# Patient Record
Sex: FEMALE | Race: WHITE | NOT HISPANIC OR LATINO | Employment: UNEMPLOYED | URBAN - METROPOLITAN AREA
[De-identification: names, ages, dates, MRNs, and addresses within clinical notes are randomized per-mention and may not be internally consistent; named-entity substitution may affect disease eponyms.]

---

## 2021-06-16 ENCOUNTER — TRANSCRIBE ORDERS (OUTPATIENT)
Dept: ADMINISTRATIVE | Facility: HOSPITAL | Age: 21
End: 2021-06-16

## 2021-06-16 DIAGNOSIS — N63.0 BREAST NODULE: Primary | ICD-10-CM

## 2023-01-10 ENCOUNTER — OFFICE VISIT (OUTPATIENT)
Dept: FAMILY MEDICINE CLINIC | Facility: CLINIC | Age: 23
End: 2023-01-10
Payer: COMMERCIAL

## 2023-01-10 ENCOUNTER — LAB (OUTPATIENT)
Dept: LAB | Facility: HOSPITAL | Age: 23
End: 2023-01-10
Payer: COMMERCIAL

## 2023-01-10 VITALS
TEMPERATURE: 97.8 F | OXYGEN SATURATION: 96 % | HEART RATE: 94 BPM | HEIGHT: 65 IN | BODY MASS INDEX: 46.15 KG/M2 | DIASTOLIC BLOOD PRESSURE: 64 MMHG | WEIGHT: 277 LBS | SYSTOLIC BLOOD PRESSURE: 110 MMHG

## 2023-01-10 DIAGNOSIS — R10.13 EPIGASTRIC PAIN: ICD-10-CM

## 2023-01-10 DIAGNOSIS — R10.30 LOWER ABDOMINAL PAIN: ICD-10-CM

## 2023-01-10 DIAGNOSIS — Z00.00 ANNUAL PHYSICAL EXAM: Primary | ICD-10-CM

## 2023-01-10 DIAGNOSIS — E28.2 PCOS (POLYCYSTIC OVARIAN SYNDROME): ICD-10-CM

## 2023-01-10 DIAGNOSIS — R13.19 OTHER DYSPHAGIA: ICD-10-CM

## 2023-01-10 DIAGNOSIS — Z00.00 ENCOUNTER FOR MEDICAL EXAMINATION TO ESTABLISH CARE: ICD-10-CM

## 2023-01-10 DIAGNOSIS — G47.33 OBSTRUCTIVE SLEEP APNEA SYNDROME: ICD-10-CM

## 2023-01-10 DIAGNOSIS — E03.9 ACQUIRED HYPOTHYROIDISM: ICD-10-CM

## 2023-01-10 DIAGNOSIS — K58.2 IRRITABLE BOWEL SYNDROME WITH BOTH CONSTIPATION AND DIARRHEA: Chronic | ICD-10-CM

## 2023-01-10 DIAGNOSIS — R19.7 DIARRHEA, UNSPECIFIED TYPE: ICD-10-CM

## 2023-01-10 PROBLEM — F41.9 ANXIETY: Status: ACTIVE | Noted: 2022-12-16

## 2023-01-10 PROCEDURE — 86037 ANCA TITER EACH ANTIBODY: CPT

## 2023-01-10 PROCEDURE — 99214 OFFICE O/P EST MOD 30 MIN: CPT | Performed by: NURSE PRACTITIONER

## 2023-01-10 PROCEDURE — 36415 COLL VENOUS BLD VENIPUNCTURE: CPT

## 2023-01-10 PROCEDURE — 83690 ASSAY OF LIPASE: CPT

## 2023-01-10 PROCEDURE — 84443 ASSAY THYROID STIM HORMONE: CPT

## 2023-01-10 PROCEDURE — 99385 PREV VISIT NEW AGE 18-39: CPT | Performed by: NURSE PRACTITIONER

## 2023-01-10 PROCEDURE — 80053 COMPREHEN METABOLIC PANEL: CPT

## 2023-01-10 PROCEDURE — 85027 COMPLETE CBC AUTOMATED: CPT

## 2023-01-10 PROCEDURE — 86671 FUNGUS NES ANTIBODY: CPT

## 2023-01-10 RX ORDER — OMEPRAZOLE 40 MG/1
40 CAPSULE, DELAYED RELEASE ORAL DAILY
Qty: 90 CAPSULE | Refills: 0 | Status: SHIPPED | OUTPATIENT
Start: 2023-01-10

## 2023-01-10 RX ORDER — LEVOTHYROXINE SODIUM 0.2 MG/1
200 TABLET ORAL DAILY
COMMUNITY
Start: 2012-01-09 | End: 2023-01-10 | Stop reason: SDUPTHER

## 2023-01-10 RX ORDER — LEVOTHYROXINE SODIUM 0.2 MG/1
200 TABLET ORAL
Qty: 90 TABLET | Refills: 3 | Status: SHIPPED | OUTPATIENT
Start: 2023-01-10

## 2023-01-10 RX ORDER — EPINEPHRINE 0.3 MG/.3ML
0.3 INJECTION SUBCUTANEOUS
COMMUNITY
Start: 2022-09-23

## 2023-01-10 RX ORDER — METFORMIN HYDROCHLORIDE 750 MG/1
750 TABLET, EXTENDED RELEASE ORAL
COMMUNITY
Start: 2022-12-16
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
## 2023-01-11 LAB
ALBUMIN SERPL-MCNC: 4.3 G/DL (ref 3.5–5.2)
ALBUMIN/GLOB SERPL: 1.7 G/DL
ALP SERPL-CCNC: 92 U/L (ref 39–117)
ALT SERPL W P-5'-P-CCNC: 38 U/L (ref 1–33)
ANION GAP SERPL CALCULATED.3IONS-SCNC: 9 MMOL/L (ref 5–15)
AST SERPL-CCNC: 38 U/L (ref 1–32)
BILIRUB SERPL-MCNC: 0.2 MG/DL (ref 0–1.2)
BUN SERPL-MCNC: 9 MG/DL (ref 6–20)
BUN/CREAT SERPL: 10.8 (ref 7–25)
CALCIUM SPEC-SCNC: 9.4 MG/DL (ref 8.6–10.5)
CHLORIDE SERPL-SCNC: 107 MMOL/L (ref 98–107)
CO2 SERPL-SCNC: 26 MMOL/L (ref 22–29)
CREAT SERPL-MCNC: 0.83 MG/DL (ref 0.57–1)
DEPRECATED RDW RBC AUTO: 41.4 FL (ref 37–54)
EGFRCR SERPLBLD CKD-EPI 2021: 101.7 ML/MIN/1.73
ERYTHROCYTE [DISTWIDTH] IN BLOOD BY AUTOMATED COUNT: 12.4 % (ref 12.3–15.4)
GLOBULIN UR ELPH-MCNC: 2.5 GM/DL
GLUCOSE SERPL-MCNC: 86 MG/DL (ref 65–99)
HCT VFR BLD AUTO: 38 % (ref 34–46.6)
HGB BLD-MCNC: 12.6 G/DL (ref 12–15.9)
LIPASE SERPL-CCNC: 27 U/L (ref 13–60)
MCH RBC QN AUTO: 30.4 PG (ref 26.6–33)
MCHC RBC AUTO-ENTMCNC: 33.2 G/DL (ref 31.5–35.7)
MCV RBC AUTO: 91.6 FL (ref 79–97)
PLATELET # BLD AUTO: 263 10*3/MM3 (ref 140–450)
PMV BLD AUTO: 11.1 FL (ref 6–12)
POTASSIUM SERPL-SCNC: 3.9 MMOL/L (ref 3.5–5.2)
PROT SERPL-MCNC: 6.8 G/DL (ref 6–8.5)
RBC # BLD AUTO: 4.15 10*6/MM3 (ref 3.77–5.28)
SODIUM SERPL-SCNC: 142 MMOL/L (ref 136–145)
TSH SERPL DL<=0.05 MIU/L-ACNC: 43.2 UIU/ML (ref 0.27–4.2)
WBC NRBC COR # BLD: 11.5 10*3/MM3 (ref 3.4–10.8)

## 2023-01-12 ENCOUNTER — TELEPHONE (OUTPATIENT)
Dept: FAMILY MEDICINE CLINIC | Facility: CLINIC | Age: 23
End: 2023-01-12
Payer: COMMERCIAL

## 2023-01-12 ENCOUNTER — TELEPHONE (OUTPATIENT)
Dept: FAMILY MEDICINE CLINIC | Facility: CLINIC | Age: 23
End: 2023-01-12

## 2023-01-12 DIAGNOSIS — E03.9 ACQUIRED HYPOTHYROIDISM: Primary | ICD-10-CM

## 2023-01-12 DIAGNOSIS — R11.2 NAUSEA AND VOMITING, UNSPECIFIED VOMITING TYPE: Primary | ICD-10-CM

## 2023-01-12 RX ORDER — LEVOTHYROXINE SODIUM 0.03 MG/1
25 TABLET ORAL
Qty: 30 TABLET | Refills: 2 | Status: SHIPPED | OUTPATIENT
Start: 2023-01-12

## 2023-01-12 RX ORDER — ONDANSETRON 4 MG/1
4 TABLET, ORALLY DISINTEGRATING ORAL EVERY 8 HOURS PRN
Qty: 30 TABLET | Refills: 0 | Status: SHIPPED | OUTPATIENT
Start: 2023-01-12 | End: 2023-02-14

## 2023-01-12 NOTE — TELEPHONE ENCOUNTER
Patient called back to office approximately 5 minutes after I called her. I discussed her elevated TSH with her and encouraged her to take the medication first thing in the morning on an empty stomach 30 minutes before eating. I advised her that I plan to increase the dosage of her levothyroxine from 200 mcg/day to 225 mcg/day and have her return for TSH in 8 weeks. If TSH remains elevated at this time I recommend endocrinology referral.

## 2023-01-12 NOTE — TELEPHONE ENCOUNTER
Caller: Mayi Poon    Relationship to patient: Self    Best call back number: [phone]    Patient is needing: PATIENT STATES SHE HAS BEEN VOMITING FOR THE LAST 3-4 HOURS FOLLOWING THE RESULT THAT WHITE BLOOD CELLS WERE ELEVATED.

## 2023-01-13 LAB
BAKER'S YEAST IGA QN: <20 UNITS (ref 0–24.9)
BAKER'S YEAST IGG QN: <20 UNITS (ref 0–24.9)
P-ANCA ATYPICAL TITR SER IF: NORMAL TITER

## 2023-01-14 PROBLEM — G47.33 OBSTRUCTIVE SLEEP APNEA SYNDROME: Chronic | Status: ACTIVE | Noted: 2023-01-10

## 2023-01-14 PROBLEM — E03.9 ACQUIRED HYPOTHYROIDISM: Chronic | Status: ACTIVE | Noted: 2022-12-16

## 2023-01-14 PROBLEM — E28.2 PCOS (POLYCYSTIC OVARIAN SYNDROME): Chronic | Status: ACTIVE | Noted: 2022-12-16

## 2023-01-14 PROBLEM — F41.9 ANXIETY: Chronic | Status: ACTIVE | Noted: 2022-12-16

## 2023-02-14 ENCOUNTER — OFFICE VISIT (OUTPATIENT)
Dept: SLEEP MEDICINE | Facility: HOSPITAL | Age: 23
End: 2023-02-14
Payer: COMMERCIAL

## 2023-02-14 VITALS
OXYGEN SATURATION: 98 % | DIASTOLIC BLOOD PRESSURE: 60 MMHG | SYSTOLIC BLOOD PRESSURE: 111 MMHG | BODY MASS INDEX: 46.65 KG/M2 | HEIGHT: 65 IN | WEIGHT: 280 LBS | HEART RATE: 79 BPM

## 2023-02-14 DIAGNOSIS — G47.33 OSA (OBSTRUCTIVE SLEEP APNEA): Primary | ICD-10-CM

## 2023-02-14 PROCEDURE — 99213 OFFICE O/P EST LOW 20 MIN: CPT | Performed by: NURSE PRACTITIONER

## 2023-02-14 NOTE — PROGRESS NOTES
Chief Complaint:   Chief Complaint   Patient presents with   • Sleeping Problem       HPI:    Mayi Poon is a 22 y.o. female here to establish care.  She does see Dr. Marian Malcolm as PCP.  Patient currently has a history of PCOS, hypothyroidism, IBS, ADHD, anxiety and MOLLY.  Patient does have a machine that is 5 years old or greater and does need an order today for a new machine.    Before using CPAP she did have snoring, increased daytime sleepiness, heartburn, dry mouth, difficulty staying asleep and nighttime sweating.  Patient states the symptoms are controlled with CPAP therapy.  Her machine is greater than 5 years old and is requesting an order for a new one today.    During the week will go to bed 8-10 get up at 6 AM weekend going to bed at 10-12 getting up anywhere from 9-11.  She does estimate getting 8 to 9 hours of sleep nightly.  She will goes to sleep within 20 minutes and states she is aware during the night that she is awake but does not need to get up and down.  Patient states when she does nap she will take a 5-hour nap.  Patient does put her [place] score of 10/24.    Patient does understand the consequences of untreated sleep apnea and wishes to continue therapy.    Social history  This is a 22-year-old pleasant female.  She does smoke 1/4 pack of cigarettes daily.  And will drink 2-3 times a week.  Patient will drink 3 cups of regular coffee a week, 2 cups of regular tea a day and 1 cola throughout the week.  She does denies illicit substance use.    Past Medical History:   Diagnosis Date   • ADHD (attention deficit hyperactivity disorder) 13 years ago   • Hyperthyroidism 12 years ago   • Obesity        Family History   Problem Relation Age of Onset   • Stroke Mother    • Thyroid disease Mother    • Thyroid disease Maternal Aunt    • Arthritis Maternal Grandmother    • Thyroid disease Maternal Grandmother    • Sleep apnea Maternal Grandfather    • Thyroid disease Paternal Grandmother    •  Cancer Paternal Grandfather        History reviewed. No pertinent surgical history.      Current medications are:   Current Outpatient Medications:   •  EPINEPHrine (EPIPEN) 0.3 MG/0.3ML solution auto-injector injection, Inject 0.3 mg into the appropriate muscle as directed by prescriber., Disp: , Rfl:   •  levothyroxine (SYNTHROID, LEVOTHROID) 200 MCG tablet, Take 1 tablet by mouth Every Morning., Disp: 90 tablet, Rfl: 3  •  levothyroxine (SYNTHROID, LEVOTHROID) 25 MCG tablet, Take 1 tablet by mouth Every Morning. Take in addition to 200 mcg dose of levothyroxine for total 225 mcg/day., Disp: 30 tablet, Rfl: 2  •  metFORMIN ER (GLUCOPHAGE-XR) 750 MG 24 hr tablet, Take 750 mg by mouth., Disp: , Rfl:   •  omeprazole (priLOSEC) 40 MG capsule, Take 1 capsule by mouth Daily. Take 30 minutes 1st meal of the day, Disp: 90 capsule, Rfl: 0  •  sertraline (ZOLOFT) 50 MG tablet, Take  by mouth., Disp: , Rfl: .      The patient's relevant past medical, surgical, family and social history were reviewed and updated in Epic as appropriate.       Review of Systems   Constitutional: Positive for fatigue.   Gastrointestinal: Positive for constipation, diarrhea and vomiting.   Musculoskeletal: Positive for myalgias.   Psychiatric/Behavioral: Positive for sleep disturbance. The patient is nervous/anxious.    All other systems reviewed and are negative.        Objective:    Physical Exam  Constitutional:       Appearance: Normal appearance.   HENT:      Head: Normocephalic and atraumatic.      Mouth/Throat:      Mouth: Mucous membranes are moist.      Pharynx: Oropharynx is clear.      Comments: Mallampati 4 anatomy  Cardiovascular:      Rate and Rhythm: Normal rate and regular rhythm.   Pulmonary:      Effort: Pulmonary effort is normal.      Breath sounds: Normal breath sounds.   Skin:     General: Skin is warm and dry.   Neurological:      Mental Status: She is alert and oriented to person, place, and time.   Psychiatric:          Mood and Affect: Mood normal.         Behavior: Behavior normal.         Thought Content: Thought content normal.         Judgment: Judgment normal.       30 over 30 days of use  Greater than 4-hour use 100%  Setting 9-15  95th percentile pressure 13.8  AHI 0.4  CPAP Report      The patient continues to use and benefit from CPAP therapy.    ASSESSMENT/PLAN    Diagnoses and all orders for this visit:    1. MOLLY (obstructive sleep apnea) (Primary)  -     PAP Therapy        1. Counseled patient regarding multimodal approach with healthy nutrition, healthy sleep, regular physical activity, social activities, counseling, and medications. Encouraged to practice lateral sleep position. Avoid alcohol and sedatives close to bedtime.  2.   For supplies x1 year.  We will do an order for new machine and see her back in 31 to 90 days.    I have reviewed the results of my evaluation and impression and discussed my recommendations in detail with the patient.      Signed by  DAGMAR Kelly    February 14, 2023      CC: Marian Malcolm APRN Johnstone, Julia, APRN

## 2023-02-17 ENCOUNTER — PATIENT ROUNDING (BHMG ONLY) (OUTPATIENT)
Dept: SLEEP MEDICINE | Facility: HOSPITAL | Age: 23
End: 2023-02-17
Payer: COMMERCIAL

## 2023-02-17 NOTE — PROGRESS NOTES
February 17, 2023    Hello, may I speak with Mayi Black?    My name is DONITA    I am  THE PRACTICE LEAD HERE with [facility]  [address]  [facility] [address] [phone].    Before we get started may I verify your date of birth? 2000    I am calling to officially welcome you to our practice and ask about your recent visit. Is this a good time to talk? YES    Tell me about your visit with us. What things went well?  IT WAS GOOD       We're always looking for ways to make our patients' experiences even better. Do you have recommendations on ways we may improve?  NO IT WENT FINE    Overall were you satisfied with your first visit to our practice? YES     I appreciate you taking the time to speak with me today. Is there anything else I can do for you? NO      Thank you, and have a great day.

## 2023-04-10 DIAGNOSIS — R10.13 EPIGASTRIC PAIN: ICD-10-CM

## 2023-04-10 RX ORDER — OMEPRAZOLE 40 MG/1
CAPSULE, DELAYED RELEASE ORAL
Qty: 90 CAPSULE | Refills: 0 | Status: SHIPPED | OUTPATIENT
Start: 2023-04-10

## 2023-05-24 ENCOUNTER — TELEPHONE (OUTPATIENT)
Dept: FAMILY MEDICINE CLINIC | Facility: CLINIC | Age: 23
End: 2023-05-24
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
## 2023-05-25 DIAGNOSIS — Z87.898 HX OF MOTION SICKNESS: Primary | ICD-10-CM

## 2023-05-25 RX ORDER — MECLIZINE HYDROCHLORIDE 25 MG/1
25 TABLET ORAL 3 TIMES DAILY PRN
Qty: 30 TABLET | Refills: 0 | Status: SHIPPED | OUTPATIENT
Start: 2023-05-25

## 2023-07-16 PROBLEM — Z91.018 MULTIPLE FOOD ALLERGIES: Status: ACTIVE | Noted: 2023-07-16

## 2023-07-16 PROBLEM — R10.31 RIGHT LOWER QUADRANT PAIN: Status: ACTIVE | Noted: 2023-07-16

## 2023-07-16 PROBLEM — R10.11 RIGHT UPPER QUADRANT PAIN: Status: ACTIVE | Noted: 2023-07-16

## 2023-07-20 ENCOUNTER — TELEPHONE (OUTPATIENT)
Dept: FAMILY MEDICINE CLINIC | Facility: CLINIC | Age: 23
End: 2023-07-20

## 2023-07-20 NOTE — TELEPHONE ENCOUNTER
Caller: Mayi Poon    Relationship: Self    Best call back number: [phone]    What orders are you requesting (i.e. lab or imaging): , CEA 19    In what timeframe would the patient need to come in: ASAP    Where will you receive your lab/imaging services: IN OFFICE    Additional notes: PATIENT WOULD LIKE TO KNOW IF SHE CAN HAVE ORDERS TO GET BLOOD WORK TO CHECK THESE ITEMS. COLON CANCER RUNS IN PATIENT'S FAMILY AND HAS BEEN HAVING STOMACH ISSUES AND WANTED TO GET THIS CHECKED. PLEASE ADVISE

## 2023-08-13 DIAGNOSIS — E28.2 PCOS (POLYCYSTIC OVARIAN SYNDROME): ICD-10-CM

## 2023-08-13 DIAGNOSIS — Z76.0 MEDICATION REFILL: ICD-10-CM

## 2023-08-14 RX ORDER — METFORMIN HYDROCHLORIDE 750 MG/1
750 TABLET, EXTENDED RELEASE ORAL
Qty: 30 TABLET | Refills: 0 | Status: SHIPPED | OUTPATIENT
Start: 2023-08-14

## 2023-08-14 NOTE — TELEPHONE ENCOUNTER
Rx Refill Note  Requested Prescriptions     Pending Prescriptions Disp Refills    metFORMIN ER (GLUCOPHAGE-XR) 750 MG 24 hr tablet [Pharmacy Med Name: metFORMIN HCL  MG TABLET] 30 tablet 0     Sig: TAKE 1 TABLET BY MOUTH DAILY WITH BREAKFAST      Last office visit with prescribing clinician: 7/14/2023   Last telemedicine visit with prescribing clinician: Visit date not found   Next office visit with prescribing clinician: 8/16/2023                         Would you like a call back once the refill request has been completed: [] Yes [] No    If the office needs to give you a call back, can they leave a voicemail: [] Yes [] No    Moris Avalos MA  08/14/23, 08:26 EDT

## 2023-08-15 NOTE — TELEPHONE ENCOUNTER
PHARMACY CALLED AND WANT TO CLARIFY IF THIS IS CORRECT INSTEAD OF THE LAST PRESCRIPTION WHERE IT WAS THE SAME DOSAGE BUT TWICE A DAY.

## 2023-08-16 ENCOUNTER — OFFICE VISIT (OUTPATIENT)
Dept: FAMILY MEDICINE CLINIC | Facility: CLINIC | Age: 23
End: 2023-08-16
Payer: COMMERCIAL

## 2023-08-16 ENCOUNTER — LAB (OUTPATIENT)
Dept: LAB | Facility: HOSPITAL | Age: 23
End: 2023-08-16
Payer: COMMERCIAL

## 2023-08-16 VITALS
SYSTOLIC BLOOD PRESSURE: 114 MMHG | BODY MASS INDEX: 49.75 KG/M2 | WEIGHT: 291.4 LBS | HEIGHT: 64 IN | TEMPERATURE: 98 F | OXYGEN SATURATION: 98 % | HEART RATE: 76 BPM | DIASTOLIC BLOOD PRESSURE: 68 MMHG

## 2023-08-16 DIAGNOSIS — R55 SYNCOPE, UNSPECIFIED SYNCOPE TYPE: ICD-10-CM

## 2023-08-16 DIAGNOSIS — G47.00 INSOMNIA, UNSPECIFIED TYPE: ICD-10-CM

## 2023-08-16 DIAGNOSIS — R55 POSTURAL DIZZINESS WITH NEAR SYNCOPE: Primary | ICD-10-CM

## 2023-08-16 DIAGNOSIS — R42 POSTURAL DIZZINESS WITH NEAR SYNCOPE: Primary | ICD-10-CM

## 2023-08-16 LAB
ALBUMIN SERPL-MCNC: 4.2 G/DL (ref 3.5–5.2)
ALBUMIN/GLOB SERPL: 1.8 G/DL
ALP SERPL-CCNC: 78 U/L (ref 39–117)
ALT SERPL W P-5'-P-CCNC: 36 U/L (ref 1–33)
ANION GAP SERPL CALCULATED.3IONS-SCNC: 9.5 MMOL/L (ref 5–15)
AST SERPL-CCNC: 26 U/L (ref 1–32)
BILIRUB SERPL-MCNC: 0.3 MG/DL (ref 0–1.2)
BUN SERPL-MCNC: 9 MG/DL (ref 6–20)
BUN/CREAT SERPL: 10.5 (ref 7–25)
CALCIUM SPEC-SCNC: 8.7 MG/DL (ref 8.6–10.5)
CHLORIDE SERPL-SCNC: 107 MMOL/L (ref 98–107)
CO2 SERPL-SCNC: 24.5 MMOL/L (ref 22–29)
CREAT SERPL-MCNC: 0.86 MG/DL (ref 0.57–1)
EGFRCR SERPLBLD CKD-EPI 2021: 97.5 ML/MIN/1.73
GLOBULIN UR ELPH-MCNC: 2.4 GM/DL
GLUCOSE SERPL-MCNC: 96 MG/DL (ref 65–99)
HBA1C MFR BLD: 5.5 % (ref 4.8–5.6)
POTASSIUM SERPL-SCNC: 4.5 MMOL/L (ref 3.5–5.2)
PROT SERPL-MCNC: 6.6 G/DL (ref 6–8.5)
SODIUM SERPL-SCNC: 141 MMOL/L (ref 136–145)
TSH SERPL DL<=0.05 MIU/L-ACNC: 30.6 UIU/ML (ref 0.27–4.2)

## 2023-08-16 PROCEDURE — 93000 ELECTROCARDIOGRAM COMPLETE: CPT | Performed by: NURSE PRACTITIONER

## 2023-08-16 PROCEDURE — 83036 HEMOGLOBIN GLYCOSYLATED A1C: CPT

## 2023-08-16 PROCEDURE — 99214 OFFICE O/P EST MOD 30 MIN: CPT | Performed by: NURSE PRACTITIONER

## 2023-08-16 PROCEDURE — 84443 ASSAY THYROID STIM HORMONE: CPT

## 2023-08-16 PROCEDURE — 80053 COMPREHEN METABOLIC PANEL: CPT

## 2023-08-16 RX ORDER — HYDROXYZINE HYDROCHLORIDE 25 MG/1
25 TABLET, FILM COATED ORAL NIGHTLY PRN
Qty: 30 TABLET | Refills: 1 | Status: SHIPPED | OUTPATIENT
Start: 2023-08-16

## 2023-08-16 NOTE — PROGRESS NOTES
Follow Up Office Note     Patient Name: Mayi Poon  : 2000   MRN: [id number]     Chief Complaint:    Chief Complaint   Patient presents with    Dizziness     Per patient she has been having dizzy spells since last Monday- comes and goes. Patient states she is also tired.     Insomnia     Per patient she is able to go to sleep but can't stay asleep; only gets about 4 hrs a night.        History of Present Illness: Mayi Poon is a 22 y.o. female who presents today with c/o intermittent dizziness and several episodes of near syncope for the past week.     Patient also c/o difficulty sleeping for the past several weeks.       Subjective      I have reviewed and the following portions of the patient's history were updated as appropriate: past family history, past medical history, past social history, past surgical history and problem list.    Review of Systems:   Review of Systems   Constitutional:  Negative for appetite change, chills, diaphoresis and fever.   HENT: Negative.     Respiratory: Negative.     Cardiovascular:  Positive for palpitations (intermittent). Negative for chest pain and leg swelling.   Gastrointestinal: Negative.    Neurological:  Positive for dizziness, weakness (episodic) and light-headedness. Negative for tremors, seizures, facial asymmetry, speech difficulty, numbness and headaches.   Psychiatric/Behavioral:  Positive for sleep disturbance.       Past Medical History:   Past Medical History:   Diagnosis Date    ADHD (attention deficit hyperactivity disorder) 13 years ago    Hyperthyroidism 12 years ago    Obesity          Medications:     Current Outpatient Medications:     dicyclomine (BENTYL) 10 MG capsule, Take 1 capsule by mouth 4 (Four) Times a Day Before Meals & at Bedtime., Disp: 120 capsule, Rfl: 1    EPINEPHrine (EPIPEN) 0.3 MG/0.3ML solution auto-injector injection, Inject 0.3 mL into the appropriate muscle as directed by prescriber., Disp: , Rfl:     famotidine  "(Pepcid) 20 MG tablet, Take 1 tablet by mouth 2 (Two) Times a Day., Disp: 60 tablet, Rfl: 2    levothyroxine (SYNTHROID, LEVOTHROID) 200 MCG tablet, Take 1 tablet by mouth Every Morning., Disp: 90 tablet, Rfl: 3    meclizine (ANTIVERT) 25 MG tablet, Take 1 tablet by mouth 3 (Three) Times a Day As Needed for Nausea (motion sickness)., Disp: 30 tablet, Rfl: 0    metFORMIN ER (GLUCOPHAGE-XR) 750 MG 24 hr tablet, TAKE 1 TABLET BY MOUTH DAILY WITH BREAKFAST, Disp: 30 tablet, Rfl: 0    omeprazole (priLOSEC) 40 MG capsule, TAKE ONE CAPSULE BY MOUTH DAILY 30 MINUTES BEFORE THE 1ST MEAL OF THE DAY, Disp: 90 capsule, Rfl: 0    sertraline (ZOLOFT) 50 MG tablet, Take  by mouth., Disp: , Rfl:     hydrOXYzine (ATARAX) 25 MG tablet, Take 1 tablet by mouth At Night As Needed (insomnia)., Disp: 30 tablet, Rfl: 1    levothyroxine (SYNTHROID, LEVOTHROID) 25 MCG tablet, Take 1 tablet by mouth Every Morning. Take 1 tablet in addition to 200 mcg tablet of levothyroxine for total of 225 mcg/day., Disp: 30 tablet, Rfl: 2    Allergies:   Allergies   Allergen Reactions    Lactose Intolerance (Gi) GI Intolerance         Objective     Physical Exam:  Vital Signs:   Vitals:    08/16/23 0948   BP: 114/68   Pulse: 76   Temp: 98 øF (36.7 øC)   TempSrc: Infrared   SpO2: 98%   Weight: 132 kg (291 lb 6.4 oz)   Height: 162.6 cm (64.02\")   PainSc: 0-No pain     Body mass index is 49.99 kg/mý.     Physical Exam  Vitals and nursing note reviewed.   Constitutional:       General: She is not in acute distress.     Appearance: Normal appearance. She is well-developed. She is not ill-appearing, toxic-appearing or diaphoretic.   HENT:      Head: Normocephalic and atraumatic.   Cardiovascular:      Rate and Rhythm: Normal rate and regular rhythm.      Heart sounds: Normal heart sounds, S1 normal and S2 normal. No murmur heard.  Pulmonary:      Effort: Pulmonary effort is normal. No respiratory distress.      Breath sounds: Normal breath sounds. No stridor. No " wheezing.   Musculoskeletal:      Right lower leg: No edema.      Left lower leg: No edema.   Skin:     General: Skin is warm and dry.   Neurological:      General: No focal deficit present.      Mental Status: She is alert and oriented to person, place, and time.   Psychiatric:         Mood and Affect: Mood normal.         Behavior: Behavior normal. Behavior is cooperative.         Thought Content: Thought content normal.         Judgment: Judgment normal.       Assessment / Plan      Assessment/Plan:   Diagnoses and all orders for this visit:    1. Postural dizziness with near syncope (Primary)  Assessment & Plan:  New problem which requires further workup. Laboratory studies per orders, further recommendation after results evaluation.   Plan to refer to heart and valve center and neurology for further evaluation.    Orders:  -     TSH; Future  -     Comprehensive Metabolic Panel; Future  -     ECG 12 Lead  -     Ambulatory Referral to [facility] [place] - [facility]  -     Ambulatory Referral to Neurology  -     Hemoglobin A1c; Future    2. Insomnia, unspecified type  Assessment & Plan:  Newly identified problem. Plan to begin trial of hydroxyzine at bedtime. Patient may need to f/u with sleep medicine should sx fail to respond to medication therapy.    Orders:  -     hydrOXYzine (ATARAX) 25 MG tablet; Take 1 tablet by mouth At Night As Needed (insomnia).  Dispense: 30 tablet; Refill: 1       ECG 12 Lead    Date/Time: 8/16/2023 10:12 AM  Performed by: Marian Malcolm APRN  Authorized by: Marian Malcolm APRN   Comparison: not compared with previous ECG   Previous ECG: no previous ECG available  Rhythm: sinus rhythm  Rate: normal  BPM: 65  QRS axis: normal  Other: no other findings    Clinical impression: normal ECG         Follow Up:   PRN and at next scheduled appointment(s) with PCP.    Discussed the nature of the medical condition(s) risks, complications, implications, management, safe and proper  use of medications. Encouraged medication compliance, and keeping scheduled follow up appointments with me and any other providers.      RTC if symptoms fail to improve, to ER if symptoms worsen.        *Dictated Utilizing Dragon Dictation   Please note that portions of this note were completed with a voice recognition program.   Part of this note may be an electronic transcription/translation of spoken language to printed text using the Dragon Dictation System. Spelling and/or grammatical errors may exist despite efforts at proofreading.      NOTE TO PATIENT: The 21st Century Cures Act makes medical notes like these available to patients in the interest of transparency. However, be advised this is a medical document. It is intended as peer to peer communication. It is written in medical language and may contain abbreviations or verbiage that are unfamiliar. It may appear blunt or direct. Medical documents are intended to carry relevant information, facts as evident, and the clinical opinion of the practitioner.      DAGMAR Casillas  [facility] [place]

## 2023-08-18 DIAGNOSIS — E03.9 ACQUIRED HYPOTHYROIDISM: Primary | ICD-10-CM

## 2023-08-18 RX ORDER — LEVOTHYROXINE SODIUM 0.03 MG/1
25 TABLET ORAL
Qty: 30 TABLET | Refills: 2 | Status: SHIPPED | OUTPATIENT
Start: 2023-08-18

## 2023-08-20 PROBLEM — R55 POSTURAL DIZZINESS WITH NEAR SYNCOPE: Status: ACTIVE | Noted: 2023-08-20

## 2023-08-20 PROBLEM — G47.00 INSOMNIA: Status: ACTIVE | Noted: 2023-08-20

## 2023-08-20 PROBLEM — R42 POSTURAL DIZZINESS WITH NEAR SYNCOPE: Status: ACTIVE | Noted: 2023-08-20

## 2023-08-20 NOTE — ASSESSMENT & PLAN NOTE
Newly identified problem. Plan to begin trial of hydroxyzine at bedtime. Patient may need to f/u with sleep medicine should sx fail to respond to medication therapy.

## 2023-08-20 NOTE — ASSESSMENT & PLAN NOTE
New problem which requires further workup. Laboratory studies per orders, further recommendation after results evaluation.   Plan to refer to heart and valve center and neurology for further evaluation.

## 2023-08-25 ENCOUNTER — PATIENT ROUNDING (BHMG ONLY) (OUTPATIENT)
Dept: CARDIOLOGY | Facility: HOSPITAL | Age: 23
End: 2023-08-25
Payer: COMMERCIAL

## 2023-08-25 ENCOUNTER — HOSPITAL ENCOUNTER (OUTPATIENT)
Dept: CARDIOLOGY | Facility: HOSPITAL | Age: 23
Discharge: HOME | End: 2023-08-25
Payer: COMMERCIAL

## 2023-08-25 ENCOUNTER — OFFICE VISIT (OUTPATIENT)
Dept: CARDIOLOGY | Facility: HOSPITAL | Age: 23
End: 2023-08-25
Payer: COMMERCIAL

## 2023-08-25 VITALS
TEMPERATURE: 97 F | HEIGHT: 64 IN | RESPIRATION RATE: 18 BRPM | BODY MASS INDEX: 40.87 KG/M2 | DIASTOLIC BLOOD PRESSURE: 63 MMHG | SYSTOLIC BLOOD PRESSURE: 106 MMHG | OXYGEN SATURATION: 96 % | WEIGHT: 239.4 LBS | HEART RATE: 79 BPM

## 2023-08-25 DIAGNOSIS — R55 NEAR SYNCOPE: ICD-10-CM

## 2023-08-25 DIAGNOSIS — G47.33 OSA (OBSTRUCTIVE SLEEP APNEA): ICD-10-CM

## 2023-08-25 DIAGNOSIS — E03.9 HYPOTHYROIDISM, UNSPECIFIED TYPE: ICD-10-CM

## 2023-08-25 DIAGNOSIS — R00.2 PALPITATIONS: ICD-10-CM

## 2023-08-25 DIAGNOSIS — R42 DIZZINESS: ICD-10-CM

## 2023-08-25 DIAGNOSIS — R42 DIZZINESS: Primary | ICD-10-CM

## 2023-08-25 DIAGNOSIS — E66.01 MORBID OBESITY WITH BMI OF 50.0-59.9, ADULT: ICD-10-CM

## 2023-08-25 DIAGNOSIS — E28.2 PCOS (POLYCYSTIC OVARIAN SYNDROME): ICD-10-CM

## 2023-08-25 NOTE — PROGRESS NOTES
[facility] Heart Monitor Documentation    Mayi Poon  2000  [id number]  08/25/23      [] ZIO XT Patch  Model [id number] Prescribed for  Days    Serial Number: (N + 9 Digits) N   Apply-By Date on Box:   USPS Tracking Number:   USPS Tracking        [] [employer] MINI PLUS Mobile Cardiac Telemetry  Model BGMINIPLUS Prescribed for  Days    Serial Number: (BGM + 7 Digits) BGM  Shipped-By Date on Box:   UPS Tracking Number: [id number]  UPS Tracking      [x] [employer] MINI Holter Monitor  Model BGMINIEL Prescribed for 14 Days    Serial Number: (7 Digits) [id number]  Shipped-By Date on Box: 08/17/2023  UPS Tracking Number: [id number]  UPS Tracking        This monitor was applied to the patient's chest and checked for proper functioning.  Ms. Mayi Poon was instructed in the proper use of this monitor.  She was given the opportunity to ask questions and left the office with the device 's instruction manual.    Connie Kong, SANDEEP, 10:12 EDT, 08/25/23                  [facility] 8[facility]

## 2023-08-25 NOTE — PROGRESS NOTES
August 25, 2023    Hello, may I speak with Mayi Poon?    My name is Ange      I am  with [facility]  [address]  [facility] [address] [phone].    Before we get started may I verify your date of birth? 2000    I am calling to officially welcome you to our practice and ask about your recent visit. Is this a good time to talk? yes    Tell me about your visit with us. What things went well?  Everything. I liked talking to Lei.       We're always looking for ways to make our patients' experiences even better. Do you have recommendations on ways we may improve?  no    Overall were you satisfied with your first visit to our practice? yes       I appreciate you taking the time to speak with me today. Is there anything else I can do for you? no      Thank you, and have a great day.

## 2023-08-25 NOTE — PROGRESS NOTES
"[facility]    Chief Complaint  Syncope    Subjective    History of Present Illness {CC  Problem List  Visit  Diagnosis   Encounters  Notes  Medications  Labs  Result Review Imaging  Media :23}     Mayi Poon presents to [facility] CARDIOLOG[facility] for   History of Present Illness     22-year-old female with history of hypothyroidism, PCOS, IBS, morbid obesity, anxiety, ADD, obstructive sleep apnea (CPap).    Patient has been struggling with insomnia.  With worsening dizziness, episodic weakness, intermittent palpitations. Duration for 1-2 months.      Palpitations with chest pain, dyspnea.  \"Difficult to talk about, I don't remember, They said I go limb and my eyes roll back in my head\". Can occur 4 times per week.  Can occur sitting, standing, activities.  No known trigger.      No Caffeine.      Anxiety well  controlled on meds    Smokes 3 cig per day.  Vapes at home.       Compliant with CPap    TSH is abnormal.  Increase of synthroid to be completed.  Plan to f/u with endocrine, needs referral.    Mom with hx of CVA.      No hx of similar s/s in the past.     Works and is also a student.     Vomits daily in the morning 5-6 times before meals. Can vomit during the day.  Denies feeling of acid reflux.  Ongoing for 3 hours.  Has not followed up with GI    Plans to f/u with neurology.  Pt with occasional HA.      Objective     Vital Signs:   Vitals:    08/25/23 0921 08/25/23 0922 08/25/23 0923   BP: 109/67 112/64 106/63   BP Location: Right arm Left arm Left arm   Patient Position: Sitting Standing Sitting   Cuff Size: Large Adult Large Adult Large Adult   Pulse: 70 87 79   Resp:   18   Temp: 97 øF (36.1 øC) 97 øF (36.1 øC) 97 øF (36.1 øC)   TempSrc: Temporal Temporal Temporal   SpO2: 97% 96% 96%   Weight:   109 kg (239 lb 6.4 oz)   Height:   162.6 cm (64\")     Body mass index is 41.09 kg/mý.  Physical Exam  Vitals reviewed. "   Constitutional:       General: She is not in acute distress.     Appearance: She is morbidly obese.   Cardiovascular:      Rate and Rhythm: Normal rate and regular rhythm.      Heart sounds: No murmur heard.  Pulmonary:      Effort: Pulmonary effort is normal.      Breath sounds: Normal breath sounds.   Musculoskeletal:      Right lower leg: No edema.      Left lower leg: No edema.   Skin:     Coloration: Skin is not pale.   Neurological:      Mental Status: She is alert.   Psychiatric:         Mood and Affect: Mood normal.         Behavior: Behavior normal. Behavior is cooperative.            Result Review  Data Reviewed:{ Labs  Result Review  Imaging  Med Tab  Media :23}   EKG per office note on 8/16/2023 reported sinus rhythm, 65 bpm.      Lab Results   Component Value Date    HGBA1C 5.50 08/16/2023     Lab Results   Component Value Date    GLUCOSE 96 08/16/2023    BUN 9 08/16/2023    CREATININE 0.86 08/16/2023    EGFR 97.5 08/16/2023    BCR 10.5 08/16/2023    K 4.5 08/16/2023    CO2 24.5 08/16/2023    CALCIUM 8.7 08/16/2023    ALBUMIN 4.2 08/16/2023    BILITOT 0.3 08/16/2023    AST 26 08/16/2023    ALT 36 (H) 08/16/2023     Lab Results   Component Value Date    TSH 30.600 (H) 08/16/2023                   Assessment and Plan {CC Problem List  Visit Diagnosis  ROS  Review (Popup)  Health Maintenance  Quality  BestPractice  Medications  SmartSets  SnapShot Encounters  Media :23}   1. Dizziness    - Holter Monitor - 72 Hour Up To 15 Days; Future  - Adult Transthoracic Echo Complete W/ Cont if Necessary Per Protocol; Future    Normal low BP.  Encouraged good hydration, avoid hypoglycemia, change positions slowly.  Look for triggers (ect, after meals, stress, ect).   2. Near syncope    - Holter Monitor - 72 Hour Up To 15 Days; Future  - Adult Transthoracic Echo Complete W/ Cont if Necessary Per Protocol; Future    Evaluated possible none cardiac causes:  insomnia, GI issues, abnormal TSH, see  neurology as scheduled.  If s/s continue or worsen will consider tilt table test.       3. Palpitations    - Holter Monitor - 72 Hour Up To 15 Days; Future  - Adult Transthoracic Echo Complete W/ Cont if Necessary Per Protocol; Future    4. Hypothyroidism, unspecified type  Change meds as ordered by PCP  See endocrine, to be scheduled  - Ambulatory Referral to Endocrinology    5. Morbid obesity with BMI of 50.0-59.9, adult  Body mass index is 41.09 kg/mý.  PCOS  Diet and exercise for conditioning, weight loss    6. MOLLY (obstructive sleep apnea)  Compliance.     7. PCOS (polycystic ovarian syndrome)  Followed by PCP currently  - Ambulatory Referral to Endocrinology          Follow Up {Instructions Charge Capture  Follow-up Communications :23}   Return in about 6 weeks (around 10/6/2023), or if symptoms worsen or fail to improve, for Video visit, palpitations, near syncope/syncope/dizziness, monitor results.    Patient was given instructions and counseling regarding her condition or for health maintenance advice. Please see specific information pulled into the AVS if appropriate.  Patient was instructed to call the [facility] with any questions, concerns, or worsening symptoms.

## 2023-09-20 DIAGNOSIS — E28.2 PCOS (POLYCYSTIC OVARIAN SYNDROME): ICD-10-CM

## 2023-09-20 DIAGNOSIS — Z76.0 MEDICATION REFILL: ICD-10-CM

## 2023-09-20 NOTE — TELEPHONE ENCOUNTER
Rx Refill Note  Requested Prescriptions     Pending Prescriptions Disp Refills    metFORMIN ER (GLUCOPHAGE-XR) 750 MG 24 hr tablet [Pharmacy Med Name: metFORMIN HCL  MG TABLET] 30 tablet 0     Sig: TAKE 1 TABLET BY MOUTH DAILY WITH BREAKFAST      Last office visit with prescribing clinician: 8/16/2023   Last telemedicine visit with prescribing clinician: Visit date not found   Next office visit with prescribing clinician: Visit date not found                         Would you like a call back once the refill request has been completed: [] Yes [] No    If the office needs to give you a call back, can they leave a voicemail: [] Yes [] No    Keya Burdick LPN  09/20/23, 08:12 EDT

## 2023-09-21 RX ORDER — METFORMIN HYDROCHLORIDE 750 MG/1
750 TABLET, EXTENDED RELEASE ORAL
Qty: 30 TABLET | Refills: 0 | Status: SHIPPED | OUTPATIENT
Start: 2023-09-21

## 2023-11-01 ENCOUNTER — OFFICE VISIT (OUTPATIENT)
Dept: FAMILY MEDICINE CLINIC | Facility: CLINIC | Age: 23
End: 2023-11-01
Payer: COMMERCIAL

## 2023-11-01 ENCOUNTER — LAB (OUTPATIENT)
Dept: LAB | Facility: HOSPITAL | Age: 23
End: 2023-11-01
Payer: COMMERCIAL

## 2023-11-01 VITALS
HEIGHT: 64 IN | HEART RATE: 82 BPM | BODY MASS INDEX: 50.02 KG/M2 | SYSTOLIC BLOOD PRESSURE: 114 MMHG | WEIGHT: 293 LBS | TEMPERATURE: 97.8 F | DIASTOLIC BLOOD PRESSURE: 64 MMHG | OXYGEN SATURATION: 98 %

## 2023-11-01 DIAGNOSIS — E03.9 ACQUIRED HYPOTHYROIDISM: Chronic | ICD-10-CM

## 2023-11-01 DIAGNOSIS — E55.9 VITAMIN D DEFICIENCY: ICD-10-CM

## 2023-11-01 DIAGNOSIS — R53.82 CHRONIC FATIGUE: ICD-10-CM

## 2023-11-01 DIAGNOSIS — R13.19 ESOPHAGEAL DYSPHAGIA: ICD-10-CM

## 2023-11-01 DIAGNOSIS — R14.0 ABDOMINAL BLOATING: ICD-10-CM

## 2023-11-01 DIAGNOSIS — R55 POSTURAL DIZZINESS WITH NEAR SYNCOPE: Primary | ICD-10-CM

## 2023-11-01 DIAGNOSIS — R42 POSTURAL DIZZINESS WITH NEAR SYNCOPE: Primary | ICD-10-CM

## 2023-11-01 DIAGNOSIS — R55 POSTURAL DIZZINESS WITH NEAR SYNCOPE: ICD-10-CM

## 2023-11-01 DIAGNOSIS — G47.33 OBSTRUCTIVE SLEEP APNEA SYNDROME: Chronic | ICD-10-CM

## 2023-11-01 DIAGNOSIS — R42 POSTURAL DIZZINESS WITH NEAR SYNCOPE: ICD-10-CM

## 2023-11-01 LAB
25(OH)D3 SERPL-MCNC: 19.8 NG/ML (ref 30–100)
ALBUMIN SERPL-MCNC: 4.3 G/DL (ref 3.5–5.2)
ALBUMIN/GLOB SERPL: 1.5 G/DL
ALP SERPL-CCNC: 89 U/L (ref 39–117)
ALT SERPL W P-5'-P-CCNC: 49 U/L (ref 1–33)
ANION GAP SERPL CALCULATED.3IONS-SCNC: 11.4 MMOL/L (ref 5–15)
AST SERPL-CCNC: 31 U/L (ref 1–32)
BILIRUB SERPL-MCNC: 0.3 MG/DL (ref 0–1.2)
BUN SERPL-MCNC: 9 MG/DL (ref 6–20)
BUN/CREAT SERPL: 9.3 (ref 7–25)
CALCIUM SPEC-SCNC: 9.4 MG/DL (ref 8.6–10.5)
CHLORIDE SERPL-SCNC: 104 MMOL/L (ref 98–107)
CO2 SERPL-SCNC: 23.6 MMOL/L (ref 22–29)
CORTIS AM PEAK SERPL-MCNC: 8.63 MCG/DL
CREAT SERPL-MCNC: 0.97 MG/DL (ref 0.57–1)
DEPRECATED RDW RBC AUTO: 40.4 FL (ref 37–54)
EGFRCR SERPLBLD CKD-EPI 2021: 83.9 ML/MIN/1.73
ERYTHROCYTE [DISTWIDTH] IN BLOOD BY AUTOMATED COUNT: 12.4 % (ref 12.3–15.4)
FERRITIN SERPL-MCNC: 109 NG/ML (ref 13–150)
GLOBULIN UR ELPH-MCNC: 2.8 GM/DL
GLUCOSE SERPL-MCNC: 84 MG/DL (ref 65–99)
HCT VFR BLD AUTO: 40.7 % (ref 34–46.6)
HGB BLD-MCNC: 13.7 G/DL (ref 12–15.9)
IRON 24H UR-MRATE: 79 MCG/DL (ref 37–145)
MCH RBC QN AUTO: 30.6 PG (ref 26.6–33)
MCHC RBC AUTO-ENTMCNC: 33.7 G/DL (ref 31.5–35.7)
MCV RBC AUTO: 90.8 FL (ref 79–97)
PLATELET # BLD AUTO: 309 10*3/MM3 (ref 140–450)
PMV BLD AUTO: 10.7 FL (ref 6–12)
POTASSIUM SERPL-SCNC: 4.3 MMOL/L (ref 3.5–5.2)
PROT SERPL-MCNC: 7.1 G/DL (ref 6–8.5)
RBC # BLD AUTO: 4.48 10*6/MM3 (ref 3.77–5.28)
SODIUM SERPL-SCNC: 139 MMOL/L (ref 136–145)
TSH SERPL DL<=0.05 MIU/L-ACNC: 46.1 UIU/ML (ref 0.27–4.2)
VIT B12 BLD-MCNC: 569 PG/ML (ref 211–946)
WBC NRBC COR # BLD: 10.74 10*3/MM3 (ref 3.4–10.8)

## 2023-11-01 PROCEDURE — 80050 GENERAL HEALTH PANEL: CPT

## 2023-11-01 PROCEDURE — 82728 ASSAY OF FERRITIN: CPT

## 2023-11-01 PROCEDURE — 82533 TOTAL CORTISOL: CPT

## 2023-11-01 PROCEDURE — 82024 ASSAY OF ACTH: CPT

## 2023-11-01 PROCEDURE — 83540 ASSAY OF IRON: CPT

## 2023-11-01 PROCEDURE — 99214 OFFICE O/P EST MOD 30 MIN: CPT | Performed by: NURSE PRACTITIONER

## 2023-11-01 PROCEDURE — 86664 EPSTEIN-BARR NUCLEAR ANTIGEN: CPT

## 2023-11-01 PROCEDURE — 82607 VITAMIN B-12: CPT

## 2023-11-01 PROCEDURE — 86665 EPSTEIN-BARR CAPSID VCA: CPT

## 2023-11-01 PROCEDURE — 82306 VITAMIN D 25 HYDROXY: CPT

## 2023-11-01 RX ORDER — BLOOD-GLUCOSE METER
1 KIT MISCELLANEOUS ONCE
Qty: 1 EACH | Refills: 0 | Status: SHIPPED | OUTPATIENT
Start: 2023-11-01 | End: 2023-11-01

## 2023-11-01 RX ORDER — LANCETS
1 EACH MISCELLANEOUS DAILY
Qty: 50 EACH | Refills: 0 | Status: SHIPPED | OUTPATIENT
Start: 2023-11-01

## 2023-11-01 NOTE — PROGRESS NOTES
Follow Up Office Note     Patient Name: Mayi Poon  : 2000   MRN: [id number]     Chief Complaint:    Chief Complaint   Patient presents with    Syncope    GI Problem    Fatigue    Sleep Apnea       History of Present Illness: Mayi Poon is a 23 y.o. female who presents today with complaint of several near syncopal episodes over the past 2 weeks.  Patient is concerned that she may have POTS.  She would like to discuss diagnostic testing and/or referrals for further evaluation.  Patient states that the events seem to occur randomly.  She is unable to identify any precipitating factors.  Patient has had a cardiac evaluation recently however she lost her Zio patch and has not been back for follow-up since.  Patient has an upcoming consult with neurology next week.    Patient also has a new complaint of feeling that food gets stuck in her esophagus.  She reports this has been going on for several months and seems to be worsening.  Patient states her last EGD was in  at [facility].  Patient is also complaining of significant abdominal bloating after eating.    Patient has chronic sleep apnea. She feels that her CPAP is no longer fitting properly and/or pressure needs adjusted. Patient c/o chronic fatigue and non-restorative sleep.    Subjective      I have reviewed and the following portions of the patient's history were updated as appropriate: past family history, past medical history, past social history, past surgical history and problem list.    Review of Systems:   Review of Systems   Constitutional:  Positive for activity change and fatigue. Negative for chills, diaphoresis, fever and unexpected weight change.   Respiratory: Negative.     Cardiovascular: Negative.    Gastrointestinal:  Positive for nausea. Negative for abdominal pain, blood in stool and vomiting.   Neurological:  Positive for dizziness. Negative for tremors, seizures, facial asymmetry, speech difficulty, weakness,  light-headedness and numbness.        Past Medical History:   Past Medical History:   Diagnosis Date    ADHD (attention deficit hyperactivity disorder) 13 years ago    Hyperthyroidism 12 years ago    Obesity          Medications:     Current Outpatient Medications:     EPINEPHrine (EPIPEN) 0.3 MG/0.3ML solution auto-injector injection, Inject 0.3 mL into the appropriate muscle as directed by prescriber., Disp: , Rfl:     levothyroxine (SYNTHROID, LEVOTHROID) 200 MCG tablet, Take 1 tablet by mouth Every Morning., Disp: 90 tablet, Rfl: 3    levothyroxine (SYNTHROID, LEVOTHROID) 25 MCG tablet, Take 1 tablet by mouth Every Morning. Take 1 tablet in addition to 200 mcg tablet of levothyroxine for total of 225 mcg/day., Disp: 30 tablet, Rfl: 2    dicyclomine (BENTYL) 10 MG capsule, Take 1 capsule by mouth 4 (Four) Times a Day Before Meals & at Bedtime. (Patient not taking: Reported on 11/1/2023), Disp: 120 capsule, Rfl: 1    famotidine (Pepcid) 20 MG tablet, Take 1 tablet by mouth 2 (Two) Times a Day. (Patient not taking: Reported on 11/1/2023), Disp: 60 tablet, Rfl: 2    glucose blood test strip, To test glucose QD-BID, Disp: 50 each, Rfl: 0    hydrOXYzine (ATARAX) 25 MG tablet, Take 1 tablet by mouth At Night As Needed (insomnia). (Patient not taking: Reported on 11/1/2023), Disp: 30 tablet, Rfl: 1    Lancets Thin misc, Use 1 each Daily., Disp: 50 each, Rfl: 0    meclizine (ANTIVERT) 25 MG tablet, Take 1 tablet by mouth 3 (Three) Times a Day As Needed for Nausea (motion sickness). (Patient not taking: Reported on 11/1/2023), Disp: 30 tablet, Rfl: 0    metFORMIN ER (GLUCOPHAGE-XR) 750 MG 24 hr tablet, TAKE 1 TABLET BY MOUTH DAILY WITH BREAKFAST (Patient not taking: Reported on 11/1/2023), Disp: 30 tablet, Rfl: 0    omeprazole (priLOSEC) 40 MG capsule, TAKE ONE CAPSULE BY MOUTH DAILY 30 MINUTES BEFORE THE 1ST MEAL OF THE DAY (Patient not taking: Reported on 11/1/2023), Disp: 90 capsule, Rfl: 0    sertraline (ZOLOFT) 50 MG  "tablet, Take 1 tablet by mouth Daily. (Patient not taking: Reported on 11/1/2023), Disp: , Rfl:     Allergies:   Allergies   Allergen Reactions    Lactose Intolerance (Gi) GI Intolerance         Objective     Physical Exam:  Vital Signs:   Vitals:    11/01/23 0831   BP: 114/64   Pulse: 82   Temp: 97.8 °F (36.6 °C)   TempSrc: Infrared   SpO2: 98%   Weight: 133 kg (294 lb 3.2 oz)   Height: 162.6 cm (64.02\")   PainSc: 0-No pain     Body mass index is 50.47 kg/m².     Physical Exam  Vitals and nursing note reviewed.   Constitutional:       General: She is not in acute distress.     Appearance: Normal appearance. She is well-developed. She is not ill-appearing, toxic-appearing or diaphoretic.   HENT:      Head: Normocephalic and atraumatic.   Cardiovascular:      Rate and Rhythm: Normal rate and regular rhythm.      Heart sounds: Normal heart sounds.   Pulmonary:      Effort: Pulmonary effort is normal. No respiratory distress.      Breath sounds: Normal breath sounds. No stridor. No wheezing.   Skin:     General: Skin is warm and dry.   Neurological:      General: No focal deficit present.      Mental Status: She is alert and oriented to person, place, and time. Mental status is at baseline.   Psychiatric:         Mood and Affect: Mood normal.         Behavior: Behavior normal. Behavior is cooperative.         Thought Content: Thought content normal.         Judgment: Judgment normal.         Assessment / Plan      Assessment/Plan:   Diagnoses and all orders for this visit:    1. Postural dizziness with near syncope (Primary)  Assessment & Plan:  Multiple episodes of near syncope recently of uncertain etiology.  Recommend patient follow-up with cardiology and keep her upcoming appointment with neurology.  Laboratory studies per orders, further recommendation after results evaluation.    Out of concern for possible hypoglycemic episodes plan to prescribe glucometer for patient and have her check her blood sugar when she " has a near syncopal episode.    Plan to check TSH today for possible worsening of hypothyroidism.    Plan to refer to sleep medicine to evaluate CPAP settings.  Concern for uncontrolled sleep apnea causing chronic fatigue.  Abdominal bloating may also be related to improperly fitted mask and/or need to adjust pressure settings.          Orders:  -     Cortisol - AM; Future  -     ACTH; Future  -     glucose monitor monitoring kit; Use 1 each 1 (One) Time for 1 dose.  Dispense: 1 each; Refill: 0  -     glucose blood test strip; To test glucose QD-BID  Dispense: 50 each; Refill: 0  -     Lancets Thin misc; Use 1 each Daily.  Dispense: 50 each; Refill: 0    2. Esophageal dysphagia  -     Ambulatory Referral to Gastroenterology    3. Abdominal bloating  -     Ambulatory Referral to Gastroenterology    4. Chronic fatigue  -     Comprehensive Metabolic Panel; Future  -     CBC (No Diff); Future  -     Vitamin B12; Future  -     Vitamin D,25-Hydroxy; Future  -     EBV Antibody Profile; Future  -     Iron; Future  -     Ferritin; Future  -     Cortisol - AM; Future  -     ACTH; Future  -     glucose monitor monitoring kit; Use 1 each 1 (One) Time for 1 dose.  Dispense: 1 each; Refill: 0  -     glucose blood test strip; To test glucose QD-BID  Dispense: 50 each; Refill: 0  -     Lancets Thin misc; Use 1 each Daily.  Dispense: 50 each; Refill: 0    5. Acquired hypothyroidism  -     TSH; Future    6. Obstructive sleep apnea syndrome  -     Ambulatory Referral to Sleep Medicine    7. Vitamin D deficiency  -     Vitamin D,25-Hydroxy; Future    .   Follow Up:   PRN and at next scheduled appointment(s) with PCP.    Discussed the nature of the medical condition(s) risks, complications, implications, management, safe and proper use of medications. Encouraged medication compliance, and keeping scheduled follow up appointments with me and any other providers.      RTC if symptoms fail to improve, to ER if symptoms  worsen.        *Dictated Utilizing Dragon Dictation   Please note that portions of this note were completed with a voice recognition program.   Part of this note may be an electronic transcription/translation of spoken language to printed text using the Dragon Dictation System. Spelling and/or grammatical errors may exist despite efforts at proofreading.      NOTE TO PATIENT: The 21st Century Cures Act makes medical notes like these available to patients in the interest of transparency. However, be advised this is a medical document. It is intended as peer to peer communication. It is written in medical language and may contain abbreviations or verbiage that are unfamiliar. It may appear blunt or direct. Medical documents are intended to carry relevant information, facts as evident, and the clinical opinion of the practitioner.      DAGMAR Casillas  [facility] [place]      negative

## 2023-11-02 ENCOUNTER — TELEPHONE (OUTPATIENT)
Dept: FAMILY MEDICINE CLINIC | Facility: CLINIC | Age: 23
End: 2023-11-02
Payer: COMMERCIAL

## 2023-11-02 LAB
ACTH PLAS-MCNC: 19.4 PG/ML (ref 7.2–63.3)
EBV NA IGG SER IA-ACNC: 595 U/ML (ref 0–17.9)
EBV VCA IGG SER IA-ACNC: >600 U/ML (ref 0–17.9)
EBV VCA IGM SER IA-ACNC: <36 U/ML (ref 0–35.9)
SERVICE CMNT-IMP: ABNORMAL

## 2023-11-02 NOTE — ASSESSMENT & PLAN NOTE
Multiple episodes of near syncope recently of uncertain etiology.  Recommend patient follow-up with cardiology and keep her upcoming appointment with neurology.  Laboratory studies per orders, further recommendation after results evaluation.    Out of concern for possible hypoglycemic episodes plan to prescribe glucometer for patient and have her check her blood sugar when she has a near syncopal episode.    Plan to check TSH today for possible worsening of hypothyroidism.    Plan to refer to sleep medicine to evaluate CPAP settings.  Concern for uncontrolled sleep apnea causing chronic fatigue.  Abdominal bloating may also be related to improperly fitted mask and/or need to adjust pressure settings.

## 2023-11-02 NOTE — TELEPHONE ENCOUNTER
Spoke with patient and informed of high TSH.  I have previously called [facility] to see if I can get patient an earlier appointment than the one she has scheduled in January and they were kind enough to accommodate this and can see patient tomorrow at 1 PM.  I advised patient of appointment availability and she is agreeable with plan of care and is willing to go to appointment tomorrow.  Patient verbalized appreciation.

## 2023-11-03 ENCOUNTER — OFFICE VISIT (OUTPATIENT)
Dept: ENDOCRINOLOGY | Facility: CLINIC | Age: 23
End: 2023-11-03
Payer: COMMERCIAL

## 2023-11-03 VITALS
WEIGHT: 293 LBS | BODY MASS INDEX: 50.02 KG/M2 | SYSTOLIC BLOOD PRESSURE: 110 MMHG | DIASTOLIC BLOOD PRESSURE: 70 MMHG | OXYGEN SATURATION: 98 % | HEART RATE: 105 BPM | HEIGHT: 64 IN

## 2023-11-03 DIAGNOSIS — R63.5 WEIGHT GAIN: ICD-10-CM

## 2023-11-03 DIAGNOSIS — N91.2 AMENORRHEA: Primary | ICD-10-CM

## 2023-11-03 DIAGNOSIS — E28.2 PCOS (POLYCYSTIC OVARIAN SYNDROME): ICD-10-CM

## 2023-11-03 DIAGNOSIS — E03.9 ACQUIRED HYPOTHYROIDISM: ICD-10-CM

## 2023-11-03 LAB
HBA1C MFR BLD: 5.7 % (ref 4.8–5.6)
T4 FREE SERPL-MCNC: 1.02 NG/DL (ref 0.93–1.7)
TSH SERPL DL<=0.05 MIU/L-ACNC: 29.3 UIU/ML (ref 0.27–4.2)

## 2023-11-03 PROCEDURE — 84439 ASSAY OF FREE THYROXINE: CPT | Performed by: PHYSICIAN ASSISTANT

## 2023-11-03 PROCEDURE — 82627 DEHYDROEPIANDROSTERONE: CPT | Performed by: PHYSICIAN ASSISTANT

## 2023-11-03 PROCEDURE — 84144 ASSAY OF PROGESTERONE: CPT | Performed by: PHYSICIAN ASSISTANT

## 2023-11-03 PROCEDURE — 82397 CHEMILUMINESCENT ASSAY: CPT | Performed by: PHYSICIAN ASSISTANT

## 2023-11-03 PROCEDURE — 83036 HEMOGLOBIN GLYCOSYLATED A1C: CPT | Performed by: PHYSICIAN ASSISTANT

## 2023-11-03 PROCEDURE — 83002 ASSAY OF GONADOTROPIN (LH): CPT | Performed by: PHYSICIAN ASSISTANT

## 2023-11-03 PROCEDURE — 82670 ASSAY OF TOTAL ESTRADIOL: CPT | Performed by: PHYSICIAN ASSISTANT

## 2023-11-03 PROCEDURE — 84443 ASSAY THYROID STIM HORMONE: CPT | Performed by: PHYSICIAN ASSISTANT

## 2023-11-03 PROCEDURE — 84146 ASSAY OF PROLACTIN: CPT | Performed by: PHYSICIAN ASSISTANT

## 2023-11-03 PROCEDURE — 83001 ASSAY OF GONADOTROPIN (FSH): CPT | Performed by: PHYSICIAN ASSISTANT

## 2023-11-03 PROCEDURE — 84403 ASSAY OF TOTAL TESTOSTERONE: CPT | Performed by: PHYSICIAN ASSISTANT

## 2023-11-03 NOTE — PROGRESS NOTES
"  Chief Complaint:   Mayi Poon is a 23 y.o. female who is being seen today for hypothyroidism and PCOS.  Referred by Lei Turner APRN     HPI     23 y.o. female presents for referring dx of hypothyroidism and PCOS.     She had Graves' diease and LOVE at age 11. She has been on replacement thyroid hormone since then. She is currently on levothyroxine total of 225 mcg daily. She reports dose has fluctuated between 200 and 225 mcg the most recently. She admits she has not been taking the medication consistently recently. She forgets to take it in the morning. She had labs done 11/1/23 and TSH was 46. Prior to that it was 30. No Free T4 available for review.   She is very fatigued.   Has never had a period. She was placed on OCPs at age 11 and took for years but stopped taking at age 19. She never had a period on OCPs either. Was diagnosed with PCOS. She reports she had a pelvic u/s but did not have polycystic appearance to the ovaries. She was on metformin but stopped taking as she felt \"funny\" on it. Periods did not start on metformin.   She was able to lose 75 pounds about 18 months ago but she has since gained 30 pounds back with the same diet and exercise routine.   She has hirsutism - dark, coarse hair on the chin.   Does struggle with acne.   Some dry skin. No constipation or hair loss.   Legs sometimes swell by the end of the day.   No compressive symptoms.   She has had several episodes of syncope in the last two months. This has occurred standing up or sitting down. Her ACTH and morning cortisol (drawn at 9 AM) were normal). Saw cardiology.   She does not have any children but desires pregnancy in the future.   Has been referred to GI due to slowed gastric emptying.     Hx of radiation to head/neck: LOVE  Family hx of thyroid cancer: no    The following portions of the patient's history were reviewed and updated as appropriate: allergies, current medications, past family history, past medical history, " past social history, past surgical history and problem list.    Review of Systems  Review of Systems   Constitutional:  Positive for fatigue and unexpected weight change (weight gain).   Cardiovascular:  Positive for leg swelling.   Genitourinary:  Positive for menstrual problem (amenorrhea).   Skin:         Acne, hirsutism   Neurological:  Positive for dizziness and syncope.   All other systems reviewed and are negative.       Current medications:  Current Outpatient Medications   Medication Sig Dispense Refill    EPINEPHrine (EPIPEN) 0.3 MG/0.3ML solution auto-injector injection Inject 0.3 mL into the appropriate muscle as directed by prescriber.      glucose blood test strip To test glucose QD-BID 50 each 0    Lancets Thin misc Use 1 each Daily. 50 each 0    levothyroxine (SYNTHROID, LEVOTHROID) 200 MCG tablet Take 1 tablet by mouth Every Morning. 90 tablet 3    levothyroxine (SYNTHROID, LEVOTHROID) 25 MCG tablet Take 1 tablet by mouth Every Morning. Take 1 tablet in addition to 200 mcg tablet of levothyroxine for total of 225 mcg/day. 30 tablet 2    meclizine (ANTIVERT) 25 MG tablet Take 1 tablet by mouth 3 (Three) Times a Day As Needed for Nausea (motion sickness). 30 tablet 0     No current facility-administered medications for this visit.       Physical Exam   Vitals:    11/03/23 1300   BP: 110/70   Pulse: 105   SpO2: 98%   Body mass index is 50.81 kg/m².  Physical Exam  Constitutional:       General: She is not in acute distress.     Appearance: She is well-developed. She is not diaphoretic.   Eyes:      General: Lids are normal.   Neck:      Thyroid: No thyroid mass or thyromegaly.      Vascular: No JVD.      Trachea: No tracheal deviation.   Cardiovascular:      Rate and Rhythm: Normal rate and regular rhythm.      Heart sounds: Normal heart sounds. No murmur heard.  Pulmonary:      Effort: Pulmonary effort is normal. No respiratory distress.      Breath sounds: Normal breath sounds. No wheezing.    Musculoskeletal:         General: No tenderness.   Lymphadenopathy:      Cervical: No cervical adenopathy.   Skin:     General: Skin is warm and dry.      Findings: No erythema or rash.   Neurological:      Mental Status: She is alert and oriented to person, place, and time.   Psychiatric:         Speech: Speech normal.         Behavior: Behavior normal.         Thought Content: Thought content normal.         Labs and Imaging   Lab Results   Component Value Date    TSH 46.100 (H) 11/01/2023           ACTH (11/01/2023 09:07)   Cortisol - AM (11/01/2023 09:07)   TSH (11/01/2023 09:07)   Comprehensive Metabolic Panel (11/01/2023 09:07)   Hemoglobin A1c (08/16/2023 10:47)     Assessment / Plan     Diagnoses and all orders for this visit:    1. Amenorrhea (Primary)  -     Prolactin  -     Follicle Stimulating Hormone  -     Luteinizing Hormone  -     Estradiol  -     Progesterone    2. PCOS (polycystic ovarian syndrome)  -     DHEA-Sulfate  -     Testosterone  -     17-Hydroxyprogesterone  -     Hemoglobin A1c  -     Antimullerian Hormone (AMH)    3. Weight gain  -     Salivary Cortisol X2, Timed    4. Acquired hypothyroidism  -     TSH  -     T4, Free  -     T4, Free; Future  -     TSH; Future        Problem List Items Addressed This Visit       Acquired hypothyroidism (Chronic)    Relevant Orders    TSH    T4, Free    T4, Free    TSH    PCOS (polycystic ovarian syndrome) (Chronic)    Relevant Orders    DHEA-Sulfate    Testosterone    17-Hydroxyprogesterone    Hemoglobin A1c    Antimullerian Hormone (AMH)     Other Visit Diagnoses       Amenorrhea    -  Primary    Relevant Orders    Prolactin    Follicle Stimulating Hormone    Luteinizing Hormone    Estradiol    Progesterone    Weight gain        Relevant Orders    Salivary Cortisol X2, Timed          Diagnosis was discussed and reviewed with the patient including the advantages of drug therapy.        Patient appears clinically hypothyroid. Recent TSH was 46 though  she had not been taking levothyroxine consistently. We discussed getting a pillbox and placing by her bed and taking first thing in the morning with water. In order for the medication to be absorbed properly it needs to be taken on an empty stomach with a cold liquid and then wait 30 to 60 minutes to eat or have hot liquids.  If she was on supplements such as iron, calcium, magnesium it would need to be taken several hours apart from the thyroid medicine.  If the dose is missed she can take 2 the following day.  Plan to repeat labs in 6-8 weeks and if TSH is still high we can change to Tirosint brand as it may provide better absorption.  She is currently on levothyroxine 225 mcg daily and her total weight-based daily dose is 215 mcg daily.    Amenorrhea - she has never had a period, check LH, FSH, estradiol, progesterone, DHEA-S, testosterone, 17-hydroxyprogesterone, prolactin    Weight gain - check midnight salivary cortisol     PCOS - labs as above       45 minutes were spent for chart review, charting, review of labs/tests, coordination of care and counselling regarding identified problems as outlined in the objective, assessment and discussion portions of the documentation.    Christina Meraz PA-C  Endocrinology

## 2023-11-03 NOTE — LETTER
"November 3, 2023     DAGMAR Correa  [address]  [address]  [facility] [address]    Patient: Mayi Poon   YOB: 2000   Date of Visit: 11/3/2023     Dear DAGMAR Correa:       Thank you for referring Mayi Poon to me for evaluation. Below are the relevant portions of my assessment and plan of care.    If you have questions, please do not hesitate to call me. I look forward to following Mayi along with you.         Sincerely,        Christina Meraz PA-C        CC: No Recipients    Christina Meraz PA-C  11/03/23 1353  Sign when Signing Visit    Chief Complaint:   Mayi Poon is a 23 y.o. female who is being seen today for hypothyroidism and PCOS.  Referred by Lei Turner APRN     HPI     23 y.o. female presents for referring dx of hypothyroidism and PCOS.     She had Graves' diease and LOVE at age 11. She has been on replacement thyroid hormone since then. She is currently on levothyroxine total of 225 mcg daily. She reports dose has fluctuated between 200 and 225 mcg the most recently. She admits she has not been taking the medication consistently recently. She forgets to take it in the morning. She had labs done 11/1/23 and TSH was 46. Prior to that it was 30. No Free T4 available for review.   She is very fatigued.   Has never had a period. She was placed on OCPs at age 11 and took for years but stopped taking at age 19. She never had a period on OCPs either. Was diagnosed with PCOS. She reports she had a pelvic u/s but did not have polycystic appearance to the ovaries. She was on metformin but stopped taking as she felt \"funny\" on it. Periods did not start on metformin.   She was able to lose 75 pounds about 18 months ago but she has since gained 30 pounds back with the same diet and exercise routine.   She has hirsutism - dark, coarse hair on the chin.   Does struggle with acne.   Some dry skin. No constipation or hair loss.   Legs sometimes swell by the end " of the day.   No compressive symptoms.   She has had several episodes of syncope in the last two months. This has occurred standing up or sitting down. Her ACTH and morning cortisol (drawn at 9 AM) were normal). Saw cardiology.   She does not have any children but desires pregnancy in the future.   Has been referred to GI due to slowed gastric emptying.     Hx of radiation to head/neck: LOVE  Family hx of thyroid cancer: no    The following portions of the patient's history were reviewed and updated as appropriate: allergies, current medications, past family history, past medical history, past social history, past surgical history and problem list.    Review of Systems  Review of Systems   Constitutional:  Positive for fatigue and unexpected weight change (weight gain).   Cardiovascular:  Positive for leg swelling.   Genitourinary:  Positive for menstrual problem (amenorrhea).   Skin:         Acne, hirsutism   Neurological:  Positive for dizziness and syncope.   All other systems reviewed and are negative.       Current medications:  Current Outpatient Medications   Medication Sig Dispense Refill   • EPINEPHrine (EPIPEN) 0.3 MG/0.3ML solution auto-injector injection Inject 0.3 mL into the appropriate muscle as directed by prescriber.     • glucose blood test strip To test glucose QD-BID 50 each 0   • Lancets Thin misc Use 1 each Daily. 50 each 0   • levothyroxine (SYNTHROID, LEVOTHROID) 200 MCG tablet Take 1 tablet by mouth Every Morning. 90 tablet 3   • levothyroxine (SYNTHROID, LEVOTHROID) 25 MCG tablet Take 1 tablet by mouth Every Morning. Take 1 tablet in addition to 200 mcg tablet of levothyroxine for total of 225 mcg/day. 30 tablet 2   • meclizine (ANTIVERT) 25 MG tablet Take 1 tablet by mouth 3 (Three) Times a Day As Needed for Nausea (motion sickness). 30 tablet 0     No current facility-administered medications for this visit.       Physical Exam   Vitals:    11/03/23 1300   BP: 110/70   Pulse: 105   SpO2:  98%   Body mass index is 50.81 kg/m².  Physical Exam  Constitutional:       General: She is not in acute distress.     Appearance: She is well-developed. She is not diaphoretic.   Eyes:      General: Lids are normal.   Neck:      Thyroid: No thyroid mass or thyromegaly.      Vascular: No JVD.      Trachea: No tracheal deviation.   Cardiovascular:      Rate and Rhythm: Normal rate and regular rhythm.      Heart sounds: Normal heart sounds. No murmur heard.  Pulmonary:      Effort: Pulmonary effort is normal. No respiratory distress.      Breath sounds: Normal breath sounds. No wheezing.   Musculoskeletal:         General: No tenderness.   Lymphadenopathy:      Cervical: No cervical adenopathy.   Skin:     General: Skin is warm and dry.      Findings: No erythema or rash.   Neurological:      Mental Status: She is alert and oriented to person, place, and time.   Psychiatric:         Speech: Speech normal.         Behavior: Behavior normal.         Thought Content: Thought content normal.         Labs and Imaging   Lab Results   Component Value Date    TSH 46.100 (H) 11/01/2023           ACTH (11/01/2023 09:07)   Cortisol - AM (11/01/2023 09:07)   TSH (11/01/2023 09:07)   Comprehensive Metabolic Panel (11/01/2023 09:07)   Hemoglobin A1c (08/16/2023 10:47)     Assessment / Plan     Diagnoses and all orders for this visit:    1. Amenorrhea (Primary)  -     Prolactin  -     Follicle Stimulating Hormone  -     Luteinizing Hormone  -     Estradiol  -     Progesterone    2. PCOS (polycystic ovarian syndrome)  -     DHEA-Sulfate  -     Testosterone  -     17-Hydroxyprogesterone  -     Hemoglobin A1c  -     Antimullerian Hormone (AMH)    3. Weight gain  -     Salivary Cortisol X2, Timed    4. Acquired hypothyroidism  -     TSH  -     T4, Free  -     T4, Free; Future  -     TSH; Future        Problem List Items Addressed This Visit       Acquired hypothyroidism (Chronic)    Relevant Orders    TSH    T4, Free    T4, Free    TSH     PCOS (polycystic ovarian syndrome) (Chronic)    Relevant Orders    DHEA-Sulfate    Testosterone    17-Hydroxyprogesterone    Hemoglobin A1c    Antimullerian Hormone (AMH)     Other Visit Diagnoses       Amenorrhea    -  Primary    Relevant Orders    Prolactin    Follicle Stimulating Hormone    Luteinizing Hormone    Estradiol    Progesterone    Weight gain        Relevant Orders    Salivary Cortisol X2, Timed          Diagnosis was discussed and reviewed with the patient including the advantages of drug therapy.        Patient appears clinically hypothyroid. Recent TSH was 46 though she had not been taking levothyroxine consistently. We discussed getting a pillbox and placing by her bed and taking first thing in the morning with water. In order for the medication to be absorbed properly it needs to be taken on an empty stomach with a cold liquid and then wait 30 to 60 minutes to eat or have hot liquids.  If she was on supplements such as iron, calcium, magnesium it would need to be taken several hours apart from the thyroid medicine.  If the dose is missed she can take 2 the following day.  Plan to repeat labs in 6-8 weeks and if TSH is still high we can change to Tirosint brand as it may provide better absorption.  She is currently on levothyroxine 225 mcg daily and her total weight-based daily dose is 215 mcg daily.    Amenorrhea - she has never had a period, check LH, FSH, estradiol, progesterone, DHEA-S, testosterone, 17-hydroxyprogesterone, prolactin    Weight gain - check midnight salivary cortisol     PCOS - labs as above       45 minutes were spent for chart review, charting, review of labs/tests, coordination of care and counselling regarding identified problems as outlined in the objective, assessment and discussion portions of the documentation.    Christina Meraz PA-C  Endocrinology

## 2023-11-04 LAB
DHEA-S SERPL-MCNC: 294 UG/DL (ref 110–431.7)
ESTRADIOL SERPL HS-MCNC: 52.5 PG/ML
FSH SERPL-ACNC: 3.45 MIU/ML
LH SERPL-ACNC: 10.3 MIU/ML
PROGEST SERPL-MCNC: 0.06 NG/ML
PROLACTIN SERPL-MCNC: 11.7 NG/ML (ref 4.79–23.3)
TESTOST SERPL-MCNC: 53.2 NG/DL (ref 8.4–48.1)

## 2023-11-10 LAB — MIS SERPL-MCNC: 16.1 NG/ML

## 2023-11-29 ENCOUNTER — LAB (OUTPATIENT)
Dept: LAB | Facility: HOSPITAL | Age: 23
End: 2023-11-29
Payer: COMMERCIAL

## 2023-11-29 ENCOUNTER — OFFICE VISIT (OUTPATIENT)
Dept: GASTROENTEROLOGY | Facility: CLINIC | Age: 23
End: 2023-11-29
Payer: COMMERCIAL

## 2023-11-29 VITALS
WEIGHT: 293 LBS | HEART RATE: 55 BPM | OXYGEN SATURATION: 100 % | SYSTOLIC BLOOD PRESSURE: 122 MMHG | BODY MASS INDEX: 50.02 KG/M2 | DIASTOLIC BLOOD PRESSURE: 68 MMHG | HEIGHT: 64 IN | TEMPERATURE: 97.1 F

## 2023-11-29 DIAGNOSIS — R11.2 NAUSEA AND VOMITING, UNSPECIFIED VOMITING TYPE: ICD-10-CM

## 2023-11-29 DIAGNOSIS — R11.2 NAUSEA AND VOMITING, UNSPECIFIED VOMITING TYPE: Primary | ICD-10-CM

## 2023-11-29 PROCEDURE — 99204 OFFICE O/P NEW MOD 45 MIN: CPT | Performed by: INTERNAL MEDICINE

## 2023-11-29 PROCEDURE — 82784 ASSAY IGA/IGD/IGG/IGM EACH: CPT

## 2023-11-29 PROCEDURE — 80053 COMPREHEN METABOLIC PANEL: CPT

## 2023-11-29 PROCEDURE — 85025 COMPLETE CBC W/AUTO DIFF WBC: CPT

## 2023-11-29 PROCEDURE — 86364 TISS TRNSGLTMNASE EA IG CLAS: CPT

## 2023-11-29 NOTE — PROGRESS NOTES
PCP: Marian Malcolm APRN    Chief Complaint   Patient presents with    Difficulty Swallowing     Intermittent heartburn/reflux, bloating,         History of Present Illness:   Mayi Poon is a 23 y.o. female who presents to the GI clinic as a consultation for nausea/vomiting, abdominal pain and irregular bowel habits. Pleasant 24 yo high bmi/adhd, anxiety, PCOS, kiya.    N/v: started > 3 years ago. Marijuana started around 7 months ago. Getting worse. May not vomit every day. No hematemesis. Associated with abdominal pain; pain epigastric intermittent. Bowel habits formed most days; may have 2-3 days in a month with no bm and may have liquid stool.  She presented to ed at [facility] and had an egd. Data deficient.  Past Medical History:   Diagnosis Date    ADHD (attention deficit hyperactivity disorder) 13 years ago    Hyperthyroidism 12 years ago    Obesity        Past Surgical History:   Procedure Laterality Date    ENDOSCOPY      NO PAST SURGERIES      UPPER GASTROINTESTINAL ENDOSCOPY           Current Outpatient Medications:     EPINEPHrine (EPIPEN) 0.3 MG/0.3ML solution auto-injector injection, Inject 0.3 mL into the appropriate muscle as directed by prescriber., Disp: , Rfl:     glucose blood test strip, To test glucose QD-BID, Disp: 50 each, Rfl: 0    Lancets Thin misc, Use 1 each Daily., Disp: 50 each, Rfl: 0    levothyroxine (SYNTHROID, LEVOTHROID) 200 MCG tablet, Take 1 tablet by mouth Every Morning., Disp: 90 tablet, Rfl: 3    levothyroxine (SYNTHROID, LEVOTHROID) 25 MCG tablet, Take 1 tablet by mouth Every Morning. Take 1 tablet in addition to 200 mcg tablet of levothyroxine for total of 225 mcg/day., Disp: 30 tablet, Rfl: 2    meclizine (ANTIVERT) 25 MG tablet, Take 1 tablet by mouth 3 (Three) Times a Day As Needed for Nausea (motion sickness)., Disp: 30 tablet, Rfl: 0    Allergies   Allergen Reactions    Lactose Intolerance (Gi) GI Intolerance       Family History   Problem Relation Age of Onset     Stroke Mother     Thyroid disease Mother     No Known Problems Father     Thyroid disease Maternal Aunt     Arthritis Maternal Grandmother     Thyroid disease Maternal Grandmother     Sleep apnea Maternal Grandfather     Arrhythmia Paternal Grandmother     Thyroid disease Paternal Grandmother     Cancer Paternal Grandfather     Colon cancer Neg Hx     Colon polyps Neg Hx     Esophageal cancer Neg Hx        Social History     Socioeconomic History    Marital status: Single   Tobacco Use    Smoking status: Every Day     Packs/day: 0.25     Years: 5.00     Additional pack years: 0.00     Total pack years: 1.25     Types: Cigarettes     Start date: 10/20/2018    Smokeless tobacco: Never   Vaping Use    Vaping Use: Every day    Substances: Nicotine, THC, Flavoring    Devices: Disposable   Substance and Sexual Activity    Alcohol use: Not Currently     Comment: 5 per month    Drug use: Not Currently     Types: Marijuana    Sexual activity: Yes     Partners: Male     Birth control/protection: None       Review of Systems  All other systems reviewed and are negative.    Vitals:    11/29/23 1505   BP: 122/68   Pulse: 55   Temp: 97.1 °F (36.2 °C)   SpO2: 100%       Physical Exam  General Appearance:  Vitals as above. no acute distress  Head/face:  Normocephalic, atraumatic  Eyes:   EOMI, no conjunctivitis or icterus   Nose/Sinuses:  Nares patent bilaterally without discharge or lesions  Mouth/Throat:  Normal oral movements without dyskinesia  Neck:  trachea is midline, no thyromegaly  Lungs:  Normal work of breathing effort, no overt rales  Heart:  No overt JVD or type VI murmur  Abdomen:  Nondistended, no guarding or rebound tenderness  Neurologic:  Alert; no focal deficits; age appropriate behavior and speech  Psychiatric: mood and affect are congruent  Vascular: extremities without edema  Skin: no rash or cyanosis.  High bmi    Assessment/Plan  1.) Nausea and vomiting  2.) high bmi  3.) Cannabis use  Workup: ed visit,  egd-data deficient  Plan:  -suspect functional, likely ibs with cannabis  - egd  - avoid marijuana    4.) Chronic abdominal pain, suspect IBS-M  - u/s abdomen  - egd  - cbc, cmp, ttg iga, iga level  - low fodmap diet, smaller volume more frequent    5.) High bmi  Exercise/hydration    6.) PCOS  She mentions hypoglycemic episodes which is unusual. She will need to f/u with pcp if having true hypoglycemia; will screen with u/s abdomen;     Jose Zaragoza MD  11/29/2023

## 2023-11-30 LAB
ALBUMIN SERPL-MCNC: 4.3 G/DL (ref 3.5–5.2)
ALBUMIN/GLOB SERPL: 2 G/DL
ALP SERPL-CCNC: 75 U/L (ref 39–117)
ALT SERPL W P-5'-P-CCNC: 46 U/L (ref 1–33)
ANION GAP SERPL CALCULATED.3IONS-SCNC: 10 MMOL/L (ref 5–15)
AST SERPL-CCNC: 26 U/L (ref 1–32)
BASOPHILS # BLD AUTO: 0.08 10*3/MM3 (ref 0–0.2)
BASOPHILS NFR BLD AUTO: 0.6 % (ref 0–1.5)
BILIRUB SERPL-MCNC: 0.3 MG/DL (ref 0–1.2)
BUN SERPL-MCNC: 8 MG/DL (ref 6–20)
BUN/CREAT SERPL: 9.6 (ref 7–25)
CALCIUM SPEC-SCNC: 9.4 MG/DL (ref 8.6–10.5)
CHLORIDE SERPL-SCNC: 104 MMOL/L (ref 98–107)
CO2 SERPL-SCNC: 26 MMOL/L (ref 22–29)
CREAT SERPL-MCNC: 0.83 MG/DL (ref 0.57–1)
DEPRECATED RDW RBC AUTO: 40.6 FL (ref 37–54)
EGFRCR SERPLBLD CKD-EPI 2021: 101.1 ML/MIN/1.73
EOSINOPHIL # BLD AUTO: 0.16 10*3/MM3 (ref 0–0.4)
EOSINOPHIL NFR BLD AUTO: 1.2 % (ref 0.3–6.2)
ERYTHROCYTE [DISTWIDTH] IN BLOOD BY AUTOMATED COUNT: 12.5 % (ref 12.3–15.4)
GLOBULIN UR ELPH-MCNC: 2.2 GM/DL
GLUCOSE SERPL-MCNC: 85 MG/DL (ref 65–99)
HCT VFR BLD AUTO: 43.8 % (ref 34–46.6)
HGB BLD-MCNC: 15 G/DL (ref 12–15.9)
IGA1 MFR SER: 204 MG/DL (ref 70–400)
IMM GRANULOCYTES # BLD AUTO: 0.12 10*3/MM3 (ref 0–0.05)
IMM GRANULOCYTES NFR BLD AUTO: 0.9 % (ref 0–0.5)
LYMPHOCYTES # BLD AUTO: 3.89 10*3/MM3 (ref 0.7–3.1)
LYMPHOCYTES NFR BLD AUTO: 28.8 % (ref 19.6–45.3)
MCH RBC QN AUTO: 30.9 PG (ref 26.6–33)
MCHC RBC AUTO-ENTMCNC: 34.2 G/DL (ref 31.5–35.7)
MCV RBC AUTO: 90.1 FL (ref 79–97)
MONOCYTES # BLD AUTO: 0.98 10*3/MM3 (ref 0.1–0.9)
MONOCYTES NFR BLD AUTO: 7.3 % (ref 5–12)
NEUTROPHILS NFR BLD AUTO: 61.2 % (ref 42.7–76)
NEUTROPHILS NFR BLD AUTO: 8.28 10*3/MM3 (ref 1.7–7)
NRBC BLD AUTO-RTO: 0 /100 WBC (ref 0–0.2)
PLATELET # BLD AUTO: 333 10*3/MM3 (ref 140–450)
PMV BLD AUTO: 11 FL (ref 6–12)
POTASSIUM SERPL-SCNC: 4 MMOL/L (ref 3.5–5.2)
PROT SERPL-MCNC: 6.5 G/DL (ref 6–8.5)
RBC # BLD AUTO: 4.86 10*6/MM3 (ref 3.77–5.28)
SODIUM SERPL-SCNC: 140 MMOL/L (ref 136–145)
WBC NRBC COR # BLD AUTO: 13.51 10*3/MM3 (ref 3.4–10.8)

## 2023-12-01 LAB — TTG IGA SER-ACNC: <2 U/ML (ref 0–3)

## 2023-12-08 ENCOUNTER — OUTSIDE FACILITY SERVICE (OUTPATIENT)
Dept: GASTROENTEROLOGY | Facility: CLINIC | Age: 23
End: 2023-12-08
Payer: COMMERCIAL

## 2023-12-08 PROCEDURE — 88305 TISSUE EXAM BY PATHOLOGIST: CPT

## 2023-12-11 ENCOUNTER — LAB REQUISITION (OUTPATIENT)
Dept: LAB | Facility: HOSPITAL | Age: 23
End: 2023-12-11
Payer: COMMERCIAL

## 2023-12-11 DIAGNOSIS — R11.0 NAUSEA: ICD-10-CM

## 2023-12-11 DIAGNOSIS — K31.89 OTHER DISEASES OF STOMACH AND DUODENUM: ICD-10-CM

## 2023-12-11 DIAGNOSIS — R13.10 DYSPHAGIA, UNSPECIFIED: ICD-10-CM

## 2023-12-11 DIAGNOSIS — R10.9 UNSPECIFIED ABDOMINAL PAIN: ICD-10-CM

## 2023-12-11 DIAGNOSIS — Q39.9 CONGENITAL MALFORMATION OF ESOPHAGUS, UNSPECIFIED: ICD-10-CM

## 2023-12-12 LAB — REF LAB TEST METHOD: NORMAL

## 2023-12-22 ENCOUNTER — TELEPHONE (OUTPATIENT)
Dept: ENDOCRINOLOGY | Facility: CLINIC | Age: 23
End: 2023-12-22
Payer: COMMERCIAL

## 2023-12-22 NOTE — TELEPHONE ENCOUNTER
Spoke with patient and she was just curious about what her test results mean. I read off Christina's result note from the lab and said she is waiting on more labs to come back. She was not able to  the salivary cortisol test kit because she was not sure on how to get it. Advised that she can come to the office to  the jug and whoever is in the lab will explain how to do it. There is also instructions on the kit. I advised Christina is out of the office until the beginning of the year so once she responds on labs we will call her back. She voiced understanding and will  the salivary cortisol kit either today or next week.

## 2023-12-24 NOTE — TELEPHONE ENCOUNTER
The labs so far are all pointing to PCOS. A1C is in the prediabetes range and diet modification and exercise are recommended. TSH (thyroid stimulating hormone) was high with plan to repeat labs in 6-7 weeks, which is about now. Still recommend completing the midnight salivary cortisol.

## 2023-12-26 NOTE — TELEPHONE ENCOUNTER
Spoke with patient and she voiced understanding. She will get the midnight salivary cortisol test done and lab work re-drawn in the next week or two.     She also inquired about Mounjaro but advised she does not have a dx of type 2 diabetes so it will not be covered. She asked about any other medications for weight loss similar to Mounjaro. Advised there is Wegovy and Saxenda that is a weight loss indicated injection. She would like Christina's thoughts on this kind of therapy. Advised she will review once she gets back in the office and I would let her know. She voiced understanding.

## 2024-01-03 NOTE — TELEPHONE ENCOUNTER
Those medications slow down how quickly food moves through the GI tract and can cause nausea, belching and sometimes constipation and diarrhea (some people constipation, some diarrhea). I see she is seeing GI already for GI issues. It would be best to hold off on these meds until GI issues resolve.

## 2024-01-04 ENCOUNTER — TELEPHONE (OUTPATIENT)
Dept: GASTROENTEROLOGY | Facility: CLINIC | Age: 24
End: 2024-01-04
Payer: COMMERCIAL

## 2024-01-04 ENCOUNTER — HOSPITAL ENCOUNTER (OUTPATIENT)
Dept: ULTRASOUND IMAGING | Facility: HOSPITAL | Age: 24
Discharge: HOME | End: 2024-01-04
Admitting: INTERNAL MEDICINE
Payer: COMMERCIAL

## 2024-01-04 DIAGNOSIS — R11.2 NAUSEA AND VOMITING, UNSPECIFIED VOMITING TYPE: ICD-10-CM

## 2024-01-04 PROCEDURE — 76700 US EXAM ABDOM COMPLETE: CPT

## 2024-01-11 DIAGNOSIS — E03.9 ACQUIRED HYPOTHYROIDISM: ICD-10-CM

## 2024-01-11 NOTE — TELEPHONE ENCOUNTER
Rx Refill Note  Requested Prescriptions     Pending Prescriptions Disp Refills    levothyroxine (SYNTHROID, LEVOTHROID) 200 MCG tablet [Pharmacy Med Name: LEVOTHYROXINE 200 MCG TABLET] 90 tablet 3     Sig: TAKE ONE TABLET BY MOUTH EVERY MORNING      Last office visit with prescribing clinician: 11/1/2023   Last telemedicine visit with prescribing clinician: Visit date not found   Next office visit with prescribing clinician: Visit date not found                         Would you like a call back once the refill request has been completed: [] Yes [] No    If the office needs to give you a call back, can they leave a voicemail: [] Yes [] No    Brook Benavides  01/11/24, 09:06 EST

## 2024-01-12 RX ORDER — LEVOTHYROXINE SODIUM 0.2 MG/1
200 TABLET ORAL DAILY
Qty: 30 TABLET | Refills: 0 | Status: SHIPPED | OUTPATIENT
Start: 2024-01-12

## 2024-01-29 ENCOUNTER — OFFICE VISIT (OUTPATIENT)
Dept: SLEEP MEDICINE | Facility: HOSPITAL | Age: 24
End: 2024-01-29
Payer: COMMERCIAL

## 2024-01-29 ENCOUNTER — PATIENT ROUNDING (BHMG ONLY) (OUTPATIENT)
Dept: SLEEP MEDICINE | Facility: HOSPITAL | Age: 24
End: 2024-01-29
Payer: COMMERCIAL

## 2024-01-29 VITALS
DIASTOLIC BLOOD PRESSURE: 56 MMHG | OXYGEN SATURATION: 97 % | WEIGHT: 292.4 LBS | HEART RATE: 75 BPM | SYSTOLIC BLOOD PRESSURE: 114 MMHG | HEIGHT: 64 IN | BODY MASS INDEX: 49.92 KG/M2

## 2024-01-29 DIAGNOSIS — E66.01 OBESITY, MORBID, BMI 40.0-49.9: Primary | ICD-10-CM

## 2024-01-29 DIAGNOSIS — G47.33 OBSTRUCTIVE SLEEP APNEA SYNDROME: Chronic | ICD-10-CM

## 2024-01-29 DIAGNOSIS — R06.83 SNORING: ICD-10-CM

## 2024-01-29 DIAGNOSIS — G47.19 EXCESSIVE DAYTIME SLEEPINESS: ICD-10-CM

## 2024-01-29 PROBLEM — R10.11 RIGHT UPPER QUADRANT PAIN: Status: RESOLVED | Noted: 2023-07-16 | Resolved: 2024-01-29

## 2024-01-29 PROBLEM — R10.31 RIGHT LOWER QUADRANT PAIN: Status: RESOLVED | Noted: 2023-07-16 | Resolved: 2024-01-29

## 2024-01-29 PROCEDURE — 99214 OFFICE O/P EST MOD 30 MIN: CPT | Performed by: INTERNAL MEDICINE

## 2024-01-29 NOTE — PROGRESS NOTES
January 29, 2024    Hello, may I speak with Mayi Black?    My name is JANNETH      I am  with [facility]  [address]  [facility] [address] [phone].    Before we get started may I verify your date of birth? 2000    I am calling to officially welcome you to our practice and ask about your recent visit. Is this a good time to talk? yes    Tell me about your visit with us. What things went well?  THE DR WAS AWESOME, AND THE STAFF WAS REALLY NICE.       We're always looking for ways to make our patients' experiences even better. Do you have recommendations on ways we may improve?  no    Overall were you satisfied with your first visit to our practice? yes       I appreciate you taking the time to speak with me today. Is there anything else I can do for you? no      Thank you, and have a great day.

## 2024-01-29 NOTE — PROGRESS NOTES
Mayi Poon is a 23 y.o. female.   Chief Complaint   Patient presents with    Sleeping Problem       HPI     23 y.o. female seen in consultation at the request of Marian Malcolm APRN for evaluation of the above.     She presents with nonrestorative sleep/daytime somnolence.  She was diagnosed with obstructive sleep apnea in [facility] [place] [facility] in late 2018.  She was placed on CPAP therapy at that time and has been on that since then.  The first 2 or 3 years she seemed improved but since then she has had increasing daytime somnolence.      She has been observed to snore despite her CPAP therapy.    I reviewed her download today which reveals good compliance with usage on 86/90 nights.  Average usage is 7 hours 38 minutes.  AHI 0.4 and average pressure 11.7 on auto CPAP range of 9-15 cm H2O.  95% pressure is 13.8.  Leak is acceptable.    Blandford Scale is: 14/24    The patient's relevant past medical, surgical, family, and social history reviewed and updated in Epic as appropriate.    Current medications are:   Current Outpatient Medications:     EPINEPHrine (EPIPEN) 0.3 MG/0.3ML solution auto-injector injection, Inject 0.3 mL into the appropriate muscle as directed by prescriber., Disp: , Rfl:     glucose blood test strip, To test glucose QD-BID, Disp: 50 each, Rfl: 0    Lancets Thin misc, Use 1 each Daily., Disp: 50 each, Rfl: 0    levothyroxine (SYNTHROID, LEVOTHROID) 200 MCG tablet, Take 1 tablet by mouth Daily., Disp: 30 tablet, Rfl: 0    levothyroxine (SYNTHROID, LEVOTHROID) 25 MCG tablet, Take 1 tablet by mouth Every Morning. Take 1 tablet in addition to 200 mcg tablet of levothyroxine for total of 225 mcg/day., Disp: 30 tablet, Rfl: 2    meclizine (ANTIVERT) 25 MG tablet, Take 1 tablet by mouth 3 (Three) Times a Day As Needed for Nausea (motion sickness)., Disp: 30 tablet, Rfl: 0    Tirzepatide-Weight Management (ZEPBOUND) 2.5 MG/0.5ML solution auto-injector, Inject 0.5 mL under the skin into the  "appropriate area as directed 1 (One) Time Per Week., Disp: 2 mL, Rfl: 1.    Review of Systems    Review of Systems  ROS documented in patient questionnaire ×14 systems.  Reviewed with patient.  Otherwise negative except as noted in HPI.    Physical Exam    Blood pressure 114/56, pulse 75, height 162.6 cm (64\"), weight 133 kg (292 lb 6.4 oz), SpO2 97%. Body mass index is 50.19 kg/m².    Physical Exam  Vitals and nursing note reviewed.   Constitutional:       Appearance: Normal appearance. She is well-developed.   HENT:      Head: Normocephalic and atraumatic.      Nose: Nose normal.      Mouth/Throat:      Mouth: Mucous membranes are moist.      Pharynx: Oropharynx is clear. No oropharyngeal exudate.      Comments: Class III airway  Eyes:      General: No scleral icterus.     Conjunctiva/sclera: Conjunctivae normal.   Neck:      Thyroid: No thyromegaly.      Trachea: No tracheal deviation.   Cardiovascular:      Rate and Rhythm: Normal rate and regular rhythm.      Heart sounds: No murmur heard.     No friction rub. No gallop.   Pulmonary:      Effort: Pulmonary effort is normal. No respiratory distress.      Breath sounds: No wheezing or rales.   Musculoskeletal:         General: No deformity. Normal range of motion.   Skin:     General: Skin is warm and dry.      Findings: No rash.   Neurological:      Mental Status: She is alert and oriented to person, place, and time.   Psychiatric:         Behavior: Behavior normal.         Thought Content: Thought content normal.         DATA:    Reviewed PSG dated 10/2/2018 from [facility] revealing an AHI of 11.6.    Reviewed current machine download as described above    Reviewed 11/1/2023 note from Marian LEAVITT      ASSESSMENT:    Problem List Items Addressed This Visit          Pulmonary Problems    Obstructive sleep apnea syndrome (Chronic)       Other    Obesity, morbid, BMI 40.0-49.9 - Primary     Other Visit Diagnoses       Snoring        Excessive " daytime sleepiness                23-year-old female with known obstructive sleep apnea diagnosed based upon a PSG from 2018 in [place].  She is currently on auto CPAP therapy at a range of 9-15 cm H2O.  Her download is acceptable with normal AHI but she has noted increasing daytime somnolence/nonrestorative sleep and has been observed to snore despite her CPAP therapy.  She uses a fullface mask.  Leak is acceptable.    I think she should have a full night titration with the expectation that she may need BiPAP therapy or higher levels of CPAP therapy    PLAN:    Full night titration as described above  Ongoing long-term efforts at healthy weight reduction  No change in current settings or mask pending her titration    I have reviewed the results of my evaluation and impression and discussed my recommendations in detail with the patient.    Signed by  Jairo Guardado MD    January 29, 2024      CC: Marian Malcolm APRN Johnstone, Julia, APRN

## 2024-08-05 ENCOUNTER — LAB (OUTPATIENT)
Dept: LAB | Facility: HOSPITAL | Age: 24
End: 2024-08-05
Payer: COMMERCIAL

## 2024-08-05 ENCOUNTER — OFFICE VISIT (OUTPATIENT)
Dept: FAMILY MEDICINE CLINIC | Facility: CLINIC | Age: 24
End: 2024-08-05
Payer: COMMERCIAL

## 2024-08-05 VITALS
BODY MASS INDEX: 48.18 KG/M2 | RESPIRATION RATE: 21 BRPM | HEART RATE: 96 BPM | WEIGHT: 282.2 LBS | DIASTOLIC BLOOD PRESSURE: 76 MMHG | HEIGHT: 64 IN | SYSTOLIC BLOOD PRESSURE: 100 MMHG | OXYGEN SATURATION: 97 % | TEMPERATURE: 98.2 F

## 2024-08-05 DIAGNOSIS — E55.9 VITAMIN D DEFICIENCY: ICD-10-CM

## 2024-08-05 DIAGNOSIS — R73.03 PREDIABETES: ICD-10-CM

## 2024-08-05 DIAGNOSIS — E66.01 OBESITY, MORBID, BMI 40.0-49.9: ICD-10-CM

## 2024-08-05 DIAGNOSIS — R63.4 ABNORMAL WEIGHT LOSS: Primary | ICD-10-CM

## 2024-08-05 DIAGNOSIS — R63.4 ABNORMAL WEIGHT LOSS: ICD-10-CM

## 2024-08-05 DIAGNOSIS — E28.2 PCOS (POLYCYSTIC OVARIAN SYNDROME): Chronic | ICD-10-CM

## 2024-08-05 DIAGNOSIS — E03.9 ACQUIRED HYPOTHYROIDISM: Chronic | ICD-10-CM

## 2024-08-05 LAB
ALBUMIN SERPL-MCNC: 4.3 G/DL (ref 3.5–5.2)
ALBUMIN/GLOB SERPL: 1.5 G/DL
ALP SERPL-CCNC: 84 U/L (ref 39–117)
ALT SERPL W P-5'-P-CCNC: 22 U/L (ref 1–33)
ANION GAP SERPL CALCULATED.3IONS-SCNC: 15 MMOL/L (ref 5–15)
AST SERPL-CCNC: 21 U/L (ref 1–32)
BASOPHILS # BLD AUTO: 0.12 10*3/MM3 (ref 0–0.2)
BASOPHILS NFR BLD AUTO: 1.1 % (ref 0–1.5)
BILIRUB SERPL-MCNC: 0.3 MG/DL (ref 0–1.2)
BUN SERPL-MCNC: 11 MG/DL (ref 6–20)
BUN/CREAT SERPL: 10.6 (ref 7–25)
CALCIUM SPEC-SCNC: 8.5 MG/DL (ref 8.6–10.5)
CHLORIDE SERPL-SCNC: 103 MMOL/L (ref 98–107)
CHOLEST SERPL-MCNC: 122 MG/DL (ref 0–200)
CO2 SERPL-SCNC: 23 MMOL/L (ref 22–29)
CREAT SERPL-MCNC: 1.04 MG/DL (ref 0.57–1)
DEPRECATED RDW RBC AUTO: 46.4 FL (ref 37–54)
EGFRCR SERPLBLD CKD-EPI 2021: 77.1 ML/MIN/1.73
EOSINOPHIL # BLD AUTO: 0.14 10*3/MM3 (ref 0–0.4)
EOSINOPHIL NFR BLD AUTO: 1.3 % (ref 0.3–6.2)
ERYTHROCYTE [DISTWIDTH] IN BLOOD BY AUTOMATED COUNT: 13 % (ref 12.3–15.4)
GLOBULIN UR ELPH-MCNC: 2.9 GM/DL
GLUCOSE SERPL-MCNC: 71 MG/DL (ref 65–99)
HBA1C MFR BLD: 5.2 % (ref 4.8–5.6)
HCT VFR BLD AUTO: 41.8 % (ref 34–46.6)
HDLC SERPL-MCNC: 41 MG/DL (ref 40–60)
HGB BLD-MCNC: 13.5 G/DL (ref 12–15.9)
IMM GRANULOCYTES # BLD AUTO: 0.07 10*3/MM3 (ref 0–0.05)
IMM GRANULOCYTES NFR BLD AUTO: 0.6 % (ref 0–0.5)
LDLC SERPL CALC-MCNC: 67 MG/DL (ref 0–100)
LDLC/HDLC SERPL: 1.65 {RATIO}
LYMPHOCYTES # BLD AUTO: 3.48 10*3/MM3 (ref 0.7–3.1)
LYMPHOCYTES NFR BLD AUTO: 31.7 % (ref 19.6–45.3)
MCH RBC QN AUTO: 31 PG (ref 26.6–33)
MCHC RBC AUTO-ENTMCNC: 32.3 G/DL (ref 31.5–35.7)
MCV RBC AUTO: 96.1 FL (ref 79–97)
MONOCYTES # BLD AUTO: 0.96 10*3/MM3 (ref 0.1–0.9)
MONOCYTES NFR BLD AUTO: 8.8 % (ref 5–12)
NEUTROPHILS NFR BLD AUTO: 56.5 % (ref 42.7–76)
NEUTROPHILS NFR BLD AUTO: 6.2 10*3/MM3 (ref 1.7–7)
NRBC BLD AUTO-RTO: 0 /100 WBC (ref 0–0.2)
PLATELET # BLD AUTO: 295 10*3/MM3 (ref 140–450)
PMV BLD AUTO: 11.2 FL (ref 6–12)
POTASSIUM SERPL-SCNC: 3.9 MMOL/L (ref 3.5–5.2)
PROT SERPL-MCNC: 7.2 G/DL (ref 6–8.5)
RBC # BLD AUTO: 4.35 10*6/MM3 (ref 3.77–5.28)
SODIUM SERPL-SCNC: 141 MMOL/L (ref 136–145)
T4 FREE SERPL-MCNC: 0.64 NG/DL (ref 0.92–1.68)
TRIGL SERPL-MCNC: 66 MG/DL (ref 0–150)
TSH SERPL DL<=0.05 MIU/L-ACNC: 79.64 UIU/ML (ref 0.27–4.2)
VLDLC SERPL-MCNC: 14 MG/DL (ref 5–40)
WBC NRBC COR # BLD AUTO: 10.97 10*3/MM3 (ref 3.4–10.8)

## 2024-08-05 PROCEDURE — 80050 GENERAL HEALTH PANEL: CPT

## 2024-08-05 PROCEDURE — 99214 OFFICE O/P EST MOD 30 MIN: CPT | Performed by: PHYSICIAN ASSISTANT

## 2024-08-05 PROCEDURE — 82306 VITAMIN D 25 HYDROXY: CPT

## 2024-08-05 PROCEDURE — 83036 HEMOGLOBIN GLYCOSYLATED A1C: CPT

## 2024-08-05 PROCEDURE — 84439 ASSAY OF FREE THYROXINE: CPT

## 2024-08-05 PROCEDURE — 80061 LIPID PANEL: CPT

## 2024-08-05 NOTE — PROGRESS NOTES
Follow Up Office Visit      Patient Name: Mayi Poon  : 2000   MRN: [id number]     Chief Complaint:    Chief Complaint   Patient presents with    [facility]     Offboarding Marian Malcolm       History of Present Illness: Mayi Poon is a 23 y.o. female who is here today to follow up and establish care. Chronic medical conditions include PCOS, chronic abdominal pain, mixed IBS, hypothyroidism, ADHD< anxiety, MOLLY, and obesity.  Patient states overall she is doing well, though she states over the last 2 weeks she has has abnormal and unexpected weight loss.   States 2 weeks ago she weighed 300 lbs and today she is down to 282.   Reports a decreased appetite and early satiety. Endorses associated nausea with occasional vomiting after eating, though she states this is intermittent. States bowels are relatively unchanged alternating between diarrhea and constipation. Endorses abdominal bloating though she states this has been ongoing for several years.   She denies any fever, chills, night sweats, body aches, chest pain, shortness of breath.   She denies any changes in medications or new medications.   States she is followed closely by GI and has had 2 endoscopies both of which were largely unremarkable.     Subjective        I have reviewed and the following portions of the patient's history were updated as appropriate: past family history, past medical history, past social history, past surgical history and problem list.    Medications:     Current Outpatient Medications:     EPINEPHrine (EPIPEN) 0.3 MG/0.3ML solution auto-injector injection, Inject 0.3 mL into the appropriate muscle as directed by prescriber., Disp: , Rfl:     levothyroxine (SYNTHROID, LEVOTHROID) 200 MCG tablet, Take 1 tablet by mouth Daily., Disp: 30 tablet, Rfl: 0    levothyroxine (SYNTHROID, LEVOTHROID) 25 MCG tablet, Take 1 tablet by mouth Every Morning. Take 1 tablet in addition to 200 mcg tablet of levothyroxine for  "total of 225 mcg/day., Disp: 30 tablet, Rfl: 2    meclizine (ANTIVERT) 25 MG tablet, Take 1 tablet by mouth 3 (Three) Times a Day As Needed for Nausea (motion sickness)., Disp: 30 tablet, Rfl: 0    Allergies:   Allergies   Allergen Reactions    Lactose Intolerance (Gi) GI Intolerance       Objective     Physical Exam:   Physical Exam  Constitutional:       General: She is not in acute distress.     Appearance: She is obese. She is not ill-appearing.   HENT:      Head: Normocephalic.      Right Ear: Tympanic membrane and ear canal normal.      Left Ear: Tympanic membrane and ear canal normal.      Nose: Nose normal.      Mouth/Throat:      Mouth: Mucous membranes are moist.      Pharynx: No oropharyngeal exudate or posterior oropharyngeal erythema.   Eyes:      Pupils: Pupils are equal, round, and reactive to light.   Cardiovascular:      Rate and Rhythm: Normal rate and regular rhythm.      Heart sounds: No murmur heard.  Pulmonary:      Effort: Pulmonary effort is normal. No respiratory distress.   Abdominal:      General: Abdomen is flat. There is no distension.      Palpations: There is no mass.      Tenderness: There is no abdominal tenderness. There is no guarding or rebound.   Musculoskeletal:         General: Normal range of motion.   Skin:     General: Skin is warm.   Neurological:      General: No focal deficit present.      Mental Status: She is alert.   Psychiatric:         Mood and Affect: Mood normal.         Vital Signs:   Vitals:    08/05/24 1308   BP: 100/76   Pulse: 96   Resp: 21   Temp: 98.2 °F (36.8 °C)   TempSrc: Temporal   SpO2: 97%   Weight: 128 kg (282 lb 3.2 oz)   Height: 162.6 cm (64.02\")     Body mass index is 48.42 kg/m².         Procedures    Assessment / Plan         Assessment and Plan     Diagnoses and all orders for this visit:    1. Abnormal weight loss (Primary)  -     Comprehensive Metabolic Panel; Future  -     CBC & Differential; Future    2. Vitamin D deficiency  -     Vitamin " D,25-Hydroxy; Future    3. Obesity, morbid, BMI 40.0-49.9  -     Lipid Panel; Future    4. PCOS (polycystic ovarian syndrome)    5. Prediabetes  -     Hemoglobin A1c; Future    6. Acquired hypothyroidism  -     TSH; Future  -     T4, Free; Future    Lengthy discussion with patient regarding symptoms. Discussed they seem to be an ongoing chronic issue. She has had multiple GI workups that have been unremarkable. Discussed my suspicion symptoms could be secondary to sequelae of PCOS and insulin resistance. Recommended multiple supplements to support this including magnesium glycinate, inositol, berberine, and saw palmetto. Recommended getting at least 130 g of protein per day.   We will complete multiple labs today and patient will follow up in 3 months unless labs suggest need for sooner follow up.        Follow Up:   Return in about 3 months (around 11/5/2024) for f/u weight, GI symptoms.    Cookie Griffin PA-C    [facility]

## 2024-08-06 LAB — 25(OH)D3 SERPL-MCNC: 25.6 NG/ML (ref 30–100)

## 2024-08-07 RX ORDER — ERGOCALCIFEROL 1.25 MG/1
50000 CAPSULE ORAL WEEKLY
Qty: 12 CAPSULE | Refills: 1 | Status: SHIPPED | OUTPATIENT
Start: 2024-08-07

## 2024-08-23 ENCOUNTER — PATIENT ROUNDING (BHMG ONLY) (OUTPATIENT)
Dept: URGENT CARE | Facility: CLINIC | Age: 24
End: 2024-08-23
Payer: COMMERCIAL

## 2024-08-28 DIAGNOSIS — E03.9 ACQUIRED HYPOTHYROIDISM: ICD-10-CM

## 2024-08-28 RX ORDER — LEVOTHYROXINE SODIUM 200 UG/1
200 TABLET ORAL DAILY
Qty: 30 TABLET | Refills: 0 | Status: SHIPPED | OUTPATIENT
Start: 2024-08-28

## 2024-08-28 NOTE — TELEPHONE ENCOUNTER
Hub staff attempted to follow warm transfer process and was unsuccessful     Caller: Mayi Poon    Relationship to patient: Self    Best call back number: [phone]     Patient is needing: PT IS MOVING TO [place] NEXT WEEK AND HAS NEW ENDO DR DOWN THERE BUT DOESN'T SEE UNTIL SEPTEMBER. CAN WE SEND REFILL FOR MONTH

## 2024-09-25 DIAGNOSIS — E03.9 ACQUIRED HYPOTHYROIDISM: ICD-10-CM

## 2024-09-25 RX ORDER — LEVOTHYROXINE SODIUM 200 UG/1
200 TABLET ORAL DAILY
Qty: 30 TABLET | Refills: 0 | OUTPATIENT
Start: 2024-09-25